# Patient Record
Sex: MALE | Race: WHITE | NOT HISPANIC OR LATINO
[De-identification: names, ages, dates, MRNs, and addresses within clinical notes are randomized per-mention and may not be internally consistent; named-entity substitution may affect disease eponyms.]

---

## 2017-02-14 ENCOUNTER — APPOINTMENT (OUTPATIENT)
Dept: NEUROLOGY | Facility: CLINIC | Age: 67
End: 2017-02-14

## 2017-04-21 ENCOUNTER — APPOINTMENT (OUTPATIENT)
Dept: NEUROLOGY | Facility: CLINIC | Age: 67
End: 2017-04-21

## 2017-04-21 RX ORDER — LINAGLIPTIN AND METFORMIN HYDROCHLORIDE 2.5; 1 MG/1; MG/1
2.5-1 TABLET, FILM COATED ORAL
Qty: 180 | Refills: 0 | Status: ACTIVE | COMMUNITY
Start: 2017-04-18

## 2017-04-28 ENCOUNTER — RX RENEWAL (OUTPATIENT)
Age: 67
End: 2017-04-28

## 2017-04-28 RX ORDER — MAGNESIUM OXIDE 200 MG
10 TABLET,CHEWABLE ORAL
Qty: 30 | Refills: 4 | Status: ACTIVE | COMMUNITY
Start: 2017-04-28 | End: 1900-01-01

## 2017-06-02 ENCOUNTER — MEDICATION RENEWAL (OUTPATIENT)
Age: 67
End: 2017-06-02

## 2017-06-06 ENCOUNTER — MEDICATION RENEWAL (OUTPATIENT)
Age: 67
End: 2017-06-06

## 2017-06-20 ENCOUNTER — APPOINTMENT (OUTPATIENT)
Dept: NEUROLOGY | Facility: CLINIC | Age: 67
End: 2017-06-20

## 2017-06-20 DIAGNOSIS — G47.00 INSOMNIA, UNSPECIFIED: ICD-10-CM

## 2017-10-10 ENCOUNTER — APPOINTMENT (OUTPATIENT)
Dept: NEUROLOGY | Facility: CLINIC | Age: 67
End: 2017-10-10
Payer: MEDICARE

## 2017-10-10 VITALS — HEART RATE: 70 BPM | SYSTOLIC BLOOD PRESSURE: 132 MMHG | DIASTOLIC BLOOD PRESSURE: 90 MMHG

## 2017-10-10 PROCEDURE — 99214 OFFICE O/P EST MOD 30 MIN: CPT

## 2018-03-13 ENCOUNTER — APPOINTMENT (OUTPATIENT)
Dept: NEUROLOGY | Facility: CLINIC | Age: 68
End: 2018-03-13
Payer: MEDICARE

## 2018-03-13 VITALS
WEIGHT: 220 LBS | DIASTOLIC BLOOD PRESSURE: 81 MMHG | HEIGHT: 72 IN | SYSTOLIC BLOOD PRESSURE: 138 MMHG | HEART RATE: 76 BPM | BODY MASS INDEX: 29.8 KG/M2

## 2018-03-13 PROCEDURE — 99214 OFFICE O/P EST MOD 30 MIN: CPT

## 2018-04-15 ENCOUNTER — RX RENEWAL (OUTPATIENT)
Age: 68
End: 2018-04-15

## 2018-05-29 ENCOUNTER — MEDICATION RENEWAL (OUTPATIENT)
Age: 68
End: 2018-05-29

## 2018-08-14 ENCOUNTER — APPOINTMENT (OUTPATIENT)
Dept: NEUROLOGY | Facility: CLINIC | Age: 68
End: 2018-08-14
Payer: MEDICARE

## 2018-08-14 PROCEDURE — 99214 OFFICE O/P EST MOD 30 MIN: CPT

## 2018-08-14 RX ORDER — CLONAZEPAM 0.5 MG/1
0.5 TABLET ORAL
Qty: 60 | Refills: 0 | Status: ACTIVE | COMMUNITY
Start: 2018-08-06

## 2018-08-14 RX ORDER — BETAMETHASONE DIPROPIONATE 0.5 MG/G
0.05 LOTION TOPICAL
Qty: 60 | Refills: 0 | Status: ACTIVE | COMMUNITY
Start: 2018-06-01

## 2018-08-14 RX ORDER — GLIMEPIRIDE 2 MG/1
2 TABLET ORAL
Qty: 90 | Refills: 0 | Status: ACTIVE | COMMUNITY
Start: 2018-07-23

## 2018-08-14 RX ORDER — METHYLPREDNISOLONE 4 MG/1
4 TABLET ORAL
Qty: 21 | Refills: 0 | Status: ACTIVE | COMMUNITY
Start: 2018-06-11

## 2018-08-14 RX ORDER — COLCHICINE 0.6 MG/1
0.6 TABLET, FILM COATED ORAL
Qty: 42 | Refills: 0 | Status: ACTIVE | COMMUNITY
Start: 2018-03-19

## 2018-08-14 RX ORDER — BACLOFEN 10 MG/1
10 TABLET ORAL
Qty: 30 | Refills: 0 | Status: ACTIVE | COMMUNITY
Start: 2018-06-11

## 2018-08-14 RX ORDER — ETODOLAC 400 MG/1
400 TABLET, FILM COATED ORAL
Qty: 60 | Refills: 0 | Status: ACTIVE | COMMUNITY
Start: 2018-07-25

## 2018-08-14 RX ORDER — NYSTATIN 100000 1/G
100000 POWDER TOPICAL
Qty: 60 | Refills: 0 | Status: ACTIVE | COMMUNITY
Start: 2018-05-30

## 2018-08-14 RX ORDER — CLOTRIMAZOLE 10 MG/ML
1 SOLUTION TOPICAL
Qty: 30 | Refills: 0 | Status: ACTIVE | COMMUNITY
Start: 2018-06-01

## 2018-10-16 ENCOUNTER — RX RENEWAL (OUTPATIENT)
Age: 68
End: 2018-10-16

## 2019-01-29 ENCOUNTER — APPOINTMENT (OUTPATIENT)
Dept: NEUROLOGY | Facility: CLINIC | Age: 69
End: 2019-01-29
Payer: MEDICARE

## 2019-01-29 PROCEDURE — 99214 OFFICE O/P EST MOD 30 MIN: CPT

## 2019-01-29 NOTE — HISTORY OF PRESENT ILLNESS
[FreeTextEntry1] : Patient is a 68 year old right handed man who was diagnosed with Parkinson's disease in 2014, when he started with difficulty writing, difficulty with gait, and decreased right arm swing. Patient states he is doing well. \par \par 1. Currently taking Sinemet 25/250 1 tab TID, Azilect, but Neupro stopped. No hallucinations. Had added artane (bid) for tremor, helping with no side effects . The tremors have improved since starting Artane, he also states improvement in walking and more energy. No swallowing difficulties or choking episode. He works in marketing for the hospitality industry. No motor fluctuations, no dyskinesias. No falls.\par 2. Sleeping better with Xanax and he uses it when he needs it. No acting out of his dreams. Mood is ok. \par 3. Changes in memory of general events, forgetful of things he has to do since the last four months, no changes since starting Artane. Did not start B12 supplements yet\par

## 2019-01-29 NOTE — REVIEW OF SYSTEMS
[Sleep Disturbances] : no sleep disturbances [As Noted in HPI] : as noted in HPI [Constipation] : no constipation [Negative] : Heme/Lymph

## 2019-01-29 NOTE — END OF VISIT
[>50% of Time Spent on Counseling and Coordination of Care for  ___] : Greater than 50% of the encounter time was spent on counseling and coordination of care for [unfilled] [Time Spent: ___ minutes] : I have spent [unfilled] minutes of face to face time with the patient [FreeTextEntry1] : Tabitha

## 2019-01-29 NOTE — PHYSICAL EXAM
[General Appearance - Alert] : alert [General Appearance - In No Acute Distress] : in no acute distress [General Appearance - Well Developed] : well developed [Oriented To Time, Place, And Person] : oriented to person, place, and time [Impaired Insight] : insight and judgment were intact [Mood] : the mood was normal [Person] : oriented to person [Place] : oriented to place [Time] : oriented to time [Short Term Intact] : short term memory intact [Registration Intact] : recent registration memory intact [Cranial Nerves Optic (II)] : visual acuity intact bilaterally,  visual fields full to confrontation, pupils equal round and reactive to light [Cranial Nerves Oculomotor (III)] : extraocular motion intact [Cranial Nerves Trigeminal (V)] : facial sensation intact symmetrically [Cranial Nerves Facial (VII)] : face symmetrical [Cranial Nerves Vestibulocochlear (VIII)] : hearing was intact bilaterally [Cranial Nerves Glossopharyngeal (IX)] : tongue and palate midline [Cranial Nerves Accessory (XI - Cranial And Spinal)] : head turning and shoulder shrug symmetric [Cranial Nerves Hypoglossal (XII)] : there was no tongue deviation with protrusion [Motor Handedness Right-Handed] : the patient is right hand dominant [FreeTextEntry1] : Facial expression is decreased as well as blinking rate. Speech is normal. Extraocular movements were intact with no square wave jerks seen. No resting tremor noted today in right side. Rigidity in RUE upon activation, no neck rigidity. Bradykinesia in right side more than left in finger and foot taps. Able to get out of the chair with arms crossed. Gait with no stooped posture, at times slow stride, with decreased right arm swing, no re-emergent tremor in right side today. Pull test was negative.

## 2019-01-29 NOTE — DISCUSSION/SUMMARY
[FreeTextEntry1] : In summary, Mr. Bynum is a 67 year old right handed man with Parkinson's disease who comes for follow up. he states since starting Artane, his tremor has improved as well as his energy and walking difficulties. No recent falls, no constipation and sleeping better. The examination was significant for a  right side resting tremor, and right sided rigidity and slowing. \par \par Overall, he has been doing well with no motor fluctuations for which will try to lower Neupro to 4mg daily to try to discontinue and continue  patient on three PD medications. If while decreasing the Neupro he feels worse, he can increase it again to 8mg. In terms of his memory, no changes since starting Artane. Encouraged to start doing exercises. Follow up in 4 months, sooner if new problems arises.\par \par Recommendations:\par 1. Continue Sinemet 25/250 1 tab TID, Azilect, Artane 2mg BID, no need for patch\par 2. Take B12 supplements (value not low officially, but lower end). \par 3. I encouraged exercise.\par 4. RTC in 4 months\par \par \par

## 2019-07-10 ENCOUNTER — APPOINTMENT (OUTPATIENT)
Dept: NEUROLOGY | Facility: CLINIC | Age: 69
End: 2019-07-10
Payer: MEDICARE

## 2019-07-10 PROCEDURE — 96372 THER/PROPH/DIAG INJ SC/IM: CPT

## 2019-07-10 PROCEDURE — 99214 OFFICE O/P EST MOD 30 MIN: CPT | Mod: 25

## 2019-07-10 RX ORDER — MELOXICAM 15 MG/1
15 TABLET ORAL
Qty: 30 | Refills: 0 | Status: ACTIVE | COMMUNITY
Start: 2019-04-03

## 2019-07-10 RX ORDER — FLUTICASONE PROPIONATE 50 UG/1
50 SPRAY, METERED NASAL
Qty: 16 | Refills: 0 | Status: ACTIVE | COMMUNITY
Start: 2019-03-27

## 2019-07-10 RX ORDER — ROTIGOTINE 4 MG/24H
4 PATCH, EXTENDED RELEASE TRANSDERMAL DAILY
Qty: 30 | Refills: 5 | Status: DISCONTINUED | COMMUNITY
Start: 2017-10-10 | End: 2019-07-10

## 2019-07-10 RX ORDER — AZITHROMYCIN 250 MG/1
250 TABLET, FILM COATED ORAL
Qty: 6 | Refills: 0 | Status: ACTIVE | COMMUNITY
Start: 2019-03-27

## 2019-07-10 NOTE — HISTORY OF PRESENT ILLNESS
[FreeTextEntry1] : Patient is a 68 year old right handed man who was diagnosed with Parkinson's disease in 2014, when he started with difficulty writing, difficulty with gait, and decreased right arm swing. Patient states he continues to have the R hand tremor, but has not been taking Sinemet as prescribed. Instead, he may take it up to two times a day. He does report compliance with Rasagiline and Trihexylphenidyl daily. \par \par 1. Currently taking Sinemet 25/250 1 tab BID, intermittently, Rasagiline 1mg daily, but Neupro stopped. No hallucinations. Had added Artane (bid) for tremor, helping with no side effects, but patient only taking once a day. The tremors have improved since starting Artane. No swallowing difficulties or choking episode. He works in marketing for the hospitality industry. No motor fluctuations, no dyskinesias. No falls. He is unsure how long the Sinemet lasts. Sometimes he has trouble shaving as he is R handed. Tries to exercise once a week. Recently joined the gym, hoping to do more. No speech difficulty.\par 2. Sleeping better with Xanax and he uses it when he needs it, but has not had a prescription in a while. No acting out of his dreams, but does have vivid dreaming. Mood is ok. Does report increased stress from finances and his ill wife.\par 3. Changes in memory of general events, forgetful of things he has to do since over the last year, no changes since starting Artane. Did not start B12 supplements yet.

## 2019-07-10 NOTE — PHYSICAL EXAM
[General Appearance - Alert] : alert [General Appearance - In No Acute Distress] : in no acute distress [General Appearance - Well Developed] : well developed [Oriented To Time, Place, And Person] : oriented to person, place, and time [Impaired Insight] : insight and judgment were intact [Person] : oriented to person [Time] : oriented to time [Place] : oriented to place [Short Term Intact] : short term memory intact [Registration Intact] : recent registration memory intact [Cranial Nerves Optic (II)] : visual acuity intact bilaterally,  visual fields full to confrontation, pupils equal round and reactive to light [Cranial Nerves Facial (VII)] : face symmetrical [Cranial Nerves Trigeminal (V)] : facial sensation intact symmetrically [Cranial Nerves Oculomotor (III)] : extraocular motion intact [Cranial Nerves Vestibulocochlear (VIII)] : hearing was intact bilaterally [Cranial Nerves Accessory (XI - Cranial And Spinal)] : head turning and shoulder shrug symmetric [Cranial Nerves Hypoglossal (XII)] : there was no tongue deviation with protrusion [Cranial Nerves Glossopharyngeal (IX)] : tongue and palate midline [Motor Handedness Right-Handed] : the patient is right hand dominant [FreeTextEntry1] : Last dose of Sinemet was 10AM this morning. Facial expression is decreased as well as blinking rate. Speech is normal. Extraocular movements were intact with no square wave jerks seen, but some impairment of smooth pursuit. Resting tremor noted today in right hand. Rigidity in RUE upon activation, no neck rigidity. Bradykinesia in right side more than left in fingers and foot taps. Able to get out of the chair with arms crossed. Gait with no stooped posture, at times slow stride, with decreased right arm swing, and re-emergent tremor on right side today. Pull test was negative.

## 2019-07-10 NOTE — END OF VISIT
[] : Fellow [>50% of Time Spent on Counseling and Coordination of Care for  ___] : Greater than 50% of the encounter time was spent on counseling and coordination of care for [unfilled] [Time Spent: ___ minutes] : I have spent [unfilled] minutes of face to face time with the patient [FreeTextEntry1] : Tabitha

## 2019-07-10 NOTE — DISCUSSION/SUMMARY
[FreeTextEntry1] : In summary, Mr. Bynum is a 69 year old right handed man with Parkinson's disease diagnosed in 2014, who comes for follow up. He states since starting Artane, his tremor has improved as well as his energy and walking difficulties. However, he is not taking the Sinemet as prescribed, and is only taking it up to two times a day. He reports compliance with daily Rasagiline and Trihexyphenidyl. No recent falls, no constipation. Does have some trouble falling asleep due to increased stress at home. The examination was significant for a right side resting tremor, and right sided rigidity and bradykinesia more than the L, with re-emergent R hand tremor when walking. \par \par Recommendations:\par 1. Continue Sinemet 25/250 1 tab TID, Rasagiline 1mg daily, Trihexyphenidyl 2mg daily, no need for Neupro patch\par 2. Take B12 supplements (value not low officially, but lower end). B12 injection given in clinic today. \par 3. 0.25mg Xanax at night as needed\par 3. Encouraged exercise.\par 4. RTC in 6 months\par \par \par

## 2019-07-10 NOTE — REVIEW OF SYSTEMS
[As Noted in HPI] : as noted in HPI [Negative] : Endocrine [Sleep Disturbances] : no sleep disturbances [Constipation] : no constipation

## 2020-01-29 ENCOUNTER — APPOINTMENT (OUTPATIENT)
Dept: NEUROLOGY | Facility: CLINIC | Age: 70
End: 2020-01-29
Payer: MEDICARE

## 2020-01-29 PROCEDURE — 99214 OFFICE O/P EST MOD 30 MIN: CPT

## 2020-01-29 NOTE — HISTORY OF PRESENT ILLNESS
[FreeTextEntry1] : Patient is a 69 year old right handed man who was diagnosed with Parkinson's disease in 2014, when he started with difficulty writing, difficulty with gait, and decreased right arm swing. \par \par 1. Currently taking Sinemet 25/250 1 tab TID, intermittently, Rasagiline 1mg daily, but Neupro stopped. No hallucinations. Had added Artane (bid) for tremor, helping with no side effects, but patient only taking once a day. The tremors have improved since starting Artane. No swallowing difficulties or choking episode. He works in marketing for the hospitality industry, more part time now. No motor fluctuations, no dyskinesias. No falls. Tries to exercise once a week. Exercises in the house. No speech difficulty.\par 2. Sleeping better with Xanax and he uses it when he needs it, but has not had a prescription in a while. No acting out of his dreams, but does have vivid dreaming. Mood is ok. Does report increased stress from finances and his ill wife.\par 3. Changes in memory of general events, forgetful of things he has to do since over the last year, no changes since starting Artane. Had one B12 injection, and taking supplements now.

## 2020-01-29 NOTE — DISCUSSION/SUMMARY
[FreeTextEntry1] : In summary, Mr. Bynum is a 69 year old right handed man with Parkinson's disease diagnosed in 2014, who comes for follow up. He states since starting Artane, his tremor has improved as well as his energy and walking difficulties. However, he is not taking the Sinemet as prescribed, and is only taking it up to two times a day. He reports compliance with daily Rasagiline and Trihexyphenidyl. No recent falls, no constipation. Does have some trouble falling asleep due to increased stress at home. The examination was significant for a right side resting tremor, and right sided rigidity and bradykinesia more than the L, with re-emergent R hand tremor when walking. \par \par Recommendations:\par 1. Continue Sinemet 25/250 1 tab TID, Rasagiline 1mg daily, Trihexyphenidyl 2mg daily, no need for Neupro patch\par 2. Take B12 supplements (value not low officially, but lower end). Recheck B12, Folate\par 3. 0.25mg Xanax at night as needed\par 3. Encouraged exercise, he wants to do [employer], which I encouraged\par 4. RTC in 6 months\par \par \par

## 2020-01-29 NOTE — PHYSICAL EXAM
[General Appearance - Alert] : alert [General Appearance - In No Acute Distress] : in no acute distress [General Appearance - Well Developed] : well developed [Impaired Insight] : insight and judgment were intact [Place] : oriented to place [Person] : oriented to person [Time] : oriented to time [Cranial Nerves Oculomotor (III)] : extraocular motion intact [Cranial Nerves Optic (II)] : visual acuity intact bilaterally,  visual fields full to confrontation, pupils equal round and reactive to light [Registration Intact] : recent registration memory intact [Cranial Nerves Facial (VII)] : face symmetrical [Cranial Nerves Trigeminal (V)] : facial sensation intact symmetrically [Cranial Nerves Vestibulocochlear (VIII)] : hearing was intact bilaterally [Cranial Nerves Glossopharyngeal (IX)] : tongue and palate midline [Motor Handedness Right-Handed] : the patient is right hand dominant [Cranial Nerves Hypoglossal (XII)] : there was no tongue deviation with protrusion [Cranial Nerves Accessory (XI - Cranial And Spinal)] : head turning and shoulder shrug symmetric [Short Term Intact] : short term memory impaired [Motor Strength] : muscle strength was normal in all four extremities [FreeTextEntry4] : 2/3 delayed recall, 3/3 with hints [FreeTextEntry1] : Facial expression is decreased as well as blinking rate. Speech is normal. Extraocular movements were intact with no square wave jerks seen, but some impairment of smooth pursuit. No resting tremor noted today. Rigidity in RUE upon activation, no neck rigidity. Bradykinesia in right side more than left in fingers and foot taps. Able to get out of the chair with arms crossed. Gait with no stooped posture, at times slow stride, with decreased right arm swing, and re-emergent tremor on right side today. Pull test was negative.

## 2020-01-29 NOTE — REVIEW OF SYSTEMS
[As Noted in HPI] : as noted in HPI [Negative] : Heme/Lymph [Sleep Disturbances] : no sleep disturbances [Constipation] : no constipation

## 2020-06-24 ENCOUNTER — APPOINTMENT (OUTPATIENT)
Dept: NEUROLOGY | Facility: CLINIC | Age: 70
End: 2020-06-24
Payer: MEDICARE

## 2020-06-24 VITALS
WEIGHT: 210 LBS | SYSTOLIC BLOOD PRESSURE: 133 MMHG | DIASTOLIC BLOOD PRESSURE: 79 MMHG | HEART RATE: 66 BPM | BODY MASS INDEX: 28.44 KG/M2 | HEIGHT: 72 IN

## 2020-06-24 PROCEDURE — 99214 OFFICE O/P EST MOD 30 MIN: CPT | Mod: 25

## 2020-06-24 NOTE — END OF VISIT
[Time Spent: ___ minutes] : I have spent [unfilled] minutes of time on the encounter. [>50% of the face to face encounter time was spent on counseling and/or coordination of care for ___] : Greater than 50% of the face to face encounter time was spent on counseling and/or coordination of care for [unfilled] [FreeTextEntry1] : Tabitha

## 2020-06-24 NOTE — HISTORY OF PRESENT ILLNESS
[FreeTextEntry1] : Patient is a 69 year old right handed man who was diagnosed with Parkinson's disease in 2014, when he started with difficulty writing, difficulty with gait, and decreased right arm swing. \par \par 1. Was currently taking Sinemet 25/250 1 tab TID, intermittently, Rasagiline 1mg daily, but Neupro stopped. No hallucinations. Tremor got worse recently, increased to qid (9am, q 4h). This also improved pain. \par Had added Artane (bid) for tremor, helping with no side effects, but patient only taking once a day. The tremors have improved since starting Artane. No swallowing difficulties or choking episode. 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 No motor fluctuations, no dyskinesias. No falls. Tries to exercise once a week. Exercises in the house. No speech difficulty.\par 2. Sleeping better with Xanax and he uses it when he needs it, but has not had a prescription in a while. No acting out of his dreams, but does have vivid dreaming. Mood is ok. Does report increased stress from finances and his ill wife.\par 3. Changes in memory of general events, forgetful of things he has to do since over the last year, no changes since starting Artane. Had one B12 injection, and not taking supplements now. \par

## 2020-06-24 NOTE — DISCUSSION/SUMMARY
[FreeTextEntry1] : In summary, Mr. Bynum is a 69 year old right handed man with Parkinson's disease diagnosed in 2014, who comes for follow up. He states since starting Artane, his tremor has improved as well as his energy and walking difficulties. However, he is not taking the Sinemet as prescribed, and is only taking it up to two times a day. He reports compliance with daily Rasagiline and Trihexyphenidyl. No recent falls, no constipation. Does have some trouble falling asleep due to increased stress at home. The examination was significant for a right side resting tremor, and right sided rigidity and bradykinesia more than the L, with re-emergent R hand tremor when walking. \par \par Recommendations:\par 1. Continue Sinemet 25/250 1 tab QID, Rasagiline 1mg daily, Trihexyphenidyl 2mg daily, no need for Neupro patch\par 2. Take B12 supplements (value not low officially, but lower end)\par 3. 0.25mg Xanax at night as needed\par 4. Encouraged exercise, he wants to do [employer] boxing, which I encouraged\par \par We discussed the above impression, plan and recommendations during the visit. Counseling represented more then 50% of the 30 minute visit time\par \par \par \par

## 2020-06-24 NOTE — PHYSICAL EXAM
[General Appearance - Alert] : alert [Impaired Insight] : insight and judgment were intact [General Appearance - In No Acute Distress] : in no acute distress [General Appearance - Well Developed] : well developed [Person] : oriented to person [Time] : oriented to time [Place] : oriented to place [Cranial Nerves Optic (II)] : visual acuity intact bilaterally,  visual fields full to confrontation, pupils equal round and reactive to light [Registration Intact] : recent registration memory intact [Cranial Nerves Oculomotor (III)] : extraocular motion intact [Cranial Nerves Facial (VII)] : face symmetrical [Cranial Nerves Vestibulocochlear (VIII)] : hearing was intact bilaterally [Cranial Nerves Accessory (XI - Cranial And Spinal)] : head turning and shoulder shrug symmetric [Cranial Nerves Glossopharyngeal (IX)] : tongue and palate midline [Motor Strength] : muscle strength was normal in all four extremities [Cranial Nerves Hypoglossal (XII)] : there was no tongue deviation with protrusion [Motor Handedness Right-Handed] : the patient is right hand dominant [Short Term Intact] : short term memory impaired [FreeTextEntry4] : 2/3 delayed recall, 3/3 with hints [FreeTextEntry1] : Facial expression is decreased as well as blinking rate. Speech is normal. Extraocular movements were intact with no square wave jerks seen, but some impairment of smooth pursuit. No resting tremor noted today. Rigidity in RUE upon activation, no neck rigidity. Bradykinesia in right side more than left in fingers and foot taps. Able to get out of the chair with arms crossed. Gait with no stooped posture, minimally decreased right arm swing, and no re-emergent tremor on right side today. Pull test was negative.

## 2020-07-29 ENCOUNTER — APPOINTMENT (OUTPATIENT)
Dept: NEUROLOGY | Facility: CLINIC | Age: 70
End: 2020-07-29
Payer: MEDICARE

## 2020-07-29 VITALS — TEMPERATURE: 97.3 F

## 2020-07-29 VITALS
SYSTOLIC BLOOD PRESSURE: 115 MMHG | BODY MASS INDEX: 28.71 KG/M2 | HEART RATE: 78 BPM | HEIGHT: 72 IN | WEIGHT: 212 LBS | DIASTOLIC BLOOD PRESSURE: 72 MMHG

## 2020-07-29 PROCEDURE — 99214 OFFICE O/P EST MOD 30 MIN: CPT

## 2020-07-29 NOTE — PHYSICAL EXAM
[General Appearance - Alert] : alert [General Appearance - In No Acute Distress] : in no acute distress [General Appearance - Well Developed] : well developed [Impaired Insight] : insight and judgment were intact [Person] : oriented to person [Time] : oriented to time [Place] : oriented to place [Registration Intact] : recent registration memory intact [Cranial Nerves Optic (II)] : visual acuity intact bilaterally,  visual fields full to confrontation, pupils equal round and reactive to light [Cranial Nerves Facial (VII)] : face symmetrical [Cranial Nerves Oculomotor (III)] : extraocular motion intact [Cranial Nerves Vestibulocochlear (VIII)] : hearing was intact bilaterally [Cranial Nerves Glossopharyngeal (IX)] : tongue and palate midline [Cranial Nerves Hypoglossal (XII)] : there was no tongue deviation with protrusion [Cranial Nerves Accessory (XI - Cranial And Spinal)] : head turning and shoulder shrug symmetric [Motor Strength] : muscle strength was normal in all four extremities [Motor Handedness Right-Handed] : the patient is right hand dominant [Short Term Intact] : short term memory impaired [FreeTextEntry4] : 2/3 delayed recall, 3/3 with hints [FreeTextEntry1] : Facial expression is decreased as well as blinking rate. Speech is normal. Extraocular movements were intact with no square wave jerks seen, but some impairment of smooth pursuit. No resting tremor noted today. \par \par Rigidity in RUE upon activation, no neck rigidity. Bradykinesia in right side more than left in fingers and foot taps. Able to get out of the chair with arms crossed. \par \par Gait with mildly stooped posture, minimally decreased right arm swing, and no re-emergent tremor on right side today. Pull test was negative.

## 2020-07-29 NOTE — DISCUSSION/SUMMARY
[FreeTextEntry1] : In summary, Mr. Bynum is a 69 year old right handed man with Parkinson's disease diagnosed in 2014, who comes for follow up. He states since starting Artane, his tremor has improved as well as his energy and walking difficulties. However, he is not taking the Sinemet as prescribed, and is only taking it up to two times a day. He reports compliance with daily Rasagiline and Trihexyphenidyl. No recent falls, no constipation. Does have some trouble falling asleep due to increased stress at home. The examination was significant for a right side resting tremor, and right sided rigidity and bradykinesia more than the L, with re-emergent R hand tremor when walking. \par \par Recommendations:\par 1. Continue Sinemet 25/250 1 tab QID (he is better with that), Rasagiline 1mg daily, Trihexyphenidyl 2mg daily, no need for Neupro patch\par 2. Take B12 supplements (value not low officially, but lower end), recheck B12\par 3. 0.25mg Xanax at night as needed\par 4. Encouraged exercise, walking\par \par We discussed the above impression, plan and recommendations during the visit. Counseling represented more then 50% of the 30 minute visit time\par \par \par \par

## 2020-07-29 NOTE — HISTORY OF PRESENT ILLNESS
[FreeTextEntry1] : Patient is a 70 year old right handed man who was diagnosed with Parkinson's disease in 2014, when he started with difficulty writing, difficulty with gait, and decreased right arm swing. \par \par 1. Increased taking Sinemet 25/250 to qID (morning, midday, later afternoon, night).Rasagiline 1mg daily, but Neupro stopped. No hallucinations. This improved pain, walking, tremor.  No motor fluctuations, no dyskinesias.\par 2. Had added Artane (bid) for tremor, helping with no side effects, but patient only taking once a day. The tremors have improved since starting Artane. No swallowing difficulties or choking episode. \par 3. Tries to exercise once a week. Exercises in the house. No speech difficulty. 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 Sleeping better with Xanax and he uses it when he needs it, but has not had a prescription in a while. No acting out of his dreams, but does have vivid dreaming. Mood is ok. Does report increased stress from finances and his ill wife.\par 3. Changes in memory of general events, forgetful of things he has to do since over the last year, no changes since starting Artane. Had one B12 injection, and taking supplements now. Mood is anxious. \par

## 2020-12-15 ENCOUNTER — APPOINTMENT (OUTPATIENT)
Dept: NEUROLOGY | Facility: CLINIC | Age: 70
End: 2020-12-15
Payer: MEDICARE

## 2020-12-15 VITALS — TEMPERATURE: 96.3 F

## 2020-12-15 VITALS
HEIGHT: 73 IN | BODY MASS INDEX: 28.23 KG/M2 | SYSTOLIC BLOOD PRESSURE: 115 MMHG | WEIGHT: 213 LBS | DIASTOLIC BLOOD PRESSURE: 71 MMHG | HEART RATE: 68 BPM

## 2020-12-15 PROCEDURE — 99214 OFFICE O/P EST MOD 30 MIN: CPT

## 2020-12-15 RX ORDER — ALPRAZOLAM 0.25 MG/1
0.25 TABLET ORAL
Qty: 30 | Refills: 0 | Status: COMPLETED | COMMUNITY
Start: 2020-12-15 | End: 2020-12-15

## 2020-12-15 RX ORDER — ALPRAZOLAM 0.25 MG/1
0.25 TABLET ORAL
Qty: 30 | Refills: 0 | Status: DISCONTINUED | COMMUNITY
Start: 2017-10-10 | End: 2020-12-15

## 2020-12-15 NOTE — DISCUSSION/SUMMARY
[FreeTextEntry1] : In summary, Mr. Bynum is a 70 year old right handed man with Parkinson's disease diagnosed in 2014, who comes for follow up. He states since starting Artane, his tremor has improved as well as his energy and walking difficulties. However, he is not taking the Sinemet as prescribed, and is only taking it up to two times a day. He reports compliance with daily Rasagiline and Trihexyphenidyl. No recent falls, no constipation. Does have some trouble falling asleep due to increased stress at home.\par \par Recommendations:\par 1. Continue Sinemet 25/250 1 tab QID (he is better with that), Rasagiline 1mg daily, Trihexyphenidyl 2mg daily, no need for Neupro patch\par 2. Taking B12 supplements (value not low officially, but lower end), recheck B12\par 3. 0.25mg Xanax at night as needed\par 4. Encouraged exercise, walking\par \par We discussed the above impression, plan and recommendations during the visit. Counseling represented more then 50% of the 30 minute visit time\par \par \par \par

## 2020-12-15 NOTE — HISTORY OF PRESENT ILLNESS
[FreeTextEntry1] : Patient is a 70 year old right handed man who was diagnosed with Parkinson's disease in 2014, when he started with difficulty writing, difficulty with gait, and decreased right arm swing. \par \par 1. Increased taking Sinemet 25/250 to QID (morning, midday, later afternoon, night). Rasagiline 1mg daily, but Neupro stopped. No hallucinations. This improved pain, walking, tremor.  No motor fluctuations, no dyskinesias.\par 2. Had added Artane (bid) for tremor, helping with no side effects, but patient only taking once a day. The tremors have improved since starting Artane. No swallowing difficulties or choking episode. \par 3. Tries to exercise once a week. Exercises in the house. No speech difficulty. Works from home in [employer] business (what little there is right now)\par 4. Sleeping better with Xanax and he uses it when he needs it, but has not had a prescription in a while. No acting out of his dreams, but does have vivid dreaming. Mood is ok. Does report increased stress from finances and his ill wife.\par 3. Changes in memory of general events, forgetful of things he has to do since over the last year, no changes since starting Artane. Had one B12 injection, and taking supplements now.\par

## 2020-12-15 NOTE — PHYSICAL EXAM
[Person] : oriented to person [Place] : oriented to place [Time] : oriented to time [Registration Intact] : recent registration memory intact [Cranial Nerves Optic (II)] : visual acuity intact bilaterally,  visual fields full to confrontation, pupils equal round and reactive to light [Cranial Nerves Oculomotor (III)] : extraocular motion intact [Cranial Nerves Facial (VII)] : face symmetrical [Cranial Nerves Vestibulocochlear (VIII)] : hearing was intact bilaterally [Cranial Nerves Glossopharyngeal (IX)] : tongue and palate midline [Cranial Nerves Accessory (XI - Cranial And Spinal)] : head turning and shoulder shrug symmetric [Cranial Nerves Hypoglossal (XII)] : there was no tongue deviation with protrusion [Motor Strength] : muscle strength was normal in all four extremities [Motor Handedness Right-Handed] : the patient is right hand dominant [Short Term Intact] : short term memory impaired [FreeTextEntry4] : 2/3 delayed recall, 3/3 with hints [FreeTextEntry1] : Seen 5 hours after dose. Facial expression is decreased as well as blinking rate. Speech is normal. Extraocular movements were intact with no square wave jerks seen, but some impairment of smooth pursuit. No resting tremor noted today. \par \par Rigidity in RUE upon activation, no neck rigidity. Minimal bradykinesia in right side more than left in fingers and foot taps. Able to get out of the chair with arms crossed. \par \par Gait with mildly stooped posture, minimally decreased right arm swing, and no re-emergent tremor on right side today. Good stride. Pull test was negative.

## 2021-01-25 ENCOUNTER — RX RENEWAL (OUTPATIENT)
Age: 71
End: 2021-01-25

## 2021-01-25 RX ORDER — TRIHEXYPHENIDYL HYDROCHLORIDE 2 MG/1
2 TABLET ORAL
Qty: 90 | Refills: 3 | Status: ACTIVE | COMMUNITY
Start: 2017-06-20 | End: 1900-01-01

## 2021-04-16 ENCOUNTER — APPOINTMENT (OUTPATIENT)
Dept: NEUROLOGY | Facility: CLINIC | Age: 71
End: 2021-04-16

## 2021-06-15 ENCOUNTER — APPOINTMENT (OUTPATIENT)
Dept: NEUROLOGY | Facility: CLINIC | Age: 71
End: 2021-06-15
Payer: MEDICARE

## 2021-06-15 PROCEDURE — 99214 OFFICE O/P EST MOD 30 MIN: CPT

## 2021-06-15 NOTE — HISTORY OF PRESENT ILLNESS
[FreeTextEntry1] : Patient is a 70 year old right handed man who was diagnosed with Parkinson's disease in 2014, when he started with difficulty writing, difficulty with gait, and decreased right arm swing. \par \par 1. Worse over past month. Did not increase taking Sinemet 25/250 tid (not qid as thought) (morning, midday, later afternoon, night). Rasagiline 1mg daily (but ran out), but Neupro stopped. No hallucinations. This improved pain, walking, tremor.  No motor fluctuations, no dyskinesias.\par 2. Had added Artane (bid) for tremor, helping with no side effects, but patient only taking once a day. The tremors have improved since starting Artane. No swallowing difficulties or choking episode. \par 3. Tries to exercise once a week. Exercises in the house. No speech difficulty. Works from home in [employer] business (what little there is right now)\par 4. Sleeping better with Xanax and he uses it when he needs it, but has not had a prescription in a while. No acting out of his dreams, but does have vivid dreaming. Mood is ok. Does report increased stress from finances and his ill wife.\par 5. Changes in memory of general events, forgetful of things he has to do since over the last year, no changes since starting Artane. Had one B12 injection, and taking supplements now (though it seems to be vitamin D)\par

## 2021-06-15 NOTE — DISCUSSION/SUMMARY
[FreeTextEntry1] : In summary, Mr. Bynum is a 70 year old right handed man with Parkinson's disease diagnosed in 2014, who comes for follow up. He states since starting Artane, his tremor has improved as well as his energy and walking difficulties. However, he is not taking the Sinemet as prescribed, and is only taking it up to two times a day. He reports compliance with daily Rasagiline and Trihexyphenidyl. No recent falls, no constipation. Does have some trouble falling asleep due to increased stress at home.\par \par Recommendations:\par 1. I wonder if he is worse because he ran out of rasagiline. restart. Continue Sinemet 25/250 1 tab TID, then go to qid again, Trihexyphenidyl 2mg daily, no need for Neupro patch\par 2. Taking B12 supplements (value not low officially, but lower end), recheck B12\par 3. 0.25mg Xanax at night as needed\par 4. Encouraged exercise, walking\par 5. Monitor memory\par \par We discussed the above impression, plan and recommendations during the visit. Counseling represented more then 50% of the 30 minute visit time\par \par \par \par

## 2021-06-15 NOTE — PHYSICAL EXAM
[Person] : oriented to person [Place] : oriented to place [Time] : oriented to time [Registration Intact] : recent registration memory intact [Cranial Nerves Optic (II)] : visual acuity intact bilaterally,  visual fields full to confrontation, pupils equal round and reactive to light [Cranial Nerves Oculomotor (III)] : extraocular motion intact [Cranial Nerves Facial (VII)] : face symmetrical [Cranial Nerves Vestibulocochlear (VIII)] : hearing was intact bilaterally [Cranial Nerves Glossopharyngeal (IX)] : tongue and palate midline [Cranial Nerves Accessory (XI - Cranial And Spinal)] : head turning and shoulder shrug symmetric [Cranial Nerves Hypoglossal (XII)] : there was no tongue deviation with protrusion [Motor Strength] : muscle strength was normal in all four extremities [Motor Handedness Right-Handed] : the patient is right hand dominant [Short Term Intact] : short term memory impaired [FreeTextEntry4] : 2/3 delayed recall, 3/3 with hints [FreeTextEntry1] : Seen 90 minutes after dose. Facial expression is decreased as well as blinking rate. Speech is normal. Extraocular movements were intact with no square wave jerks seen, but some impairment of smooth pursuit. No resting tremor noted today. \par \par Rigidity in RUE upon activation, no neck rigidity. Minimal bradykinesia in right side more than left in fingers and foot taps. Able to get out of the chair with arms crossed. \par \par Gait with mildly stooped posture, minimally decreased right arm swing, and no re-emergent tremor on right side today. Good stride. Pull test was negative.

## 2021-08-03 ENCOUNTER — RX RENEWAL (OUTPATIENT)
Age: 71
End: 2021-08-03

## 2021-08-06 ENCOUNTER — NON-APPOINTMENT (OUTPATIENT)
Age: 71
End: 2021-08-06

## 2021-10-20 ENCOUNTER — APPOINTMENT (OUTPATIENT)
Dept: NEUROLOGY | Facility: CLINIC | Age: 71
End: 2021-10-20

## 2022-01-21 ENCOUNTER — RX RENEWAL (OUTPATIENT)
Age: 72
End: 2022-01-21

## 2022-01-21 RX ORDER — SELEGILINE HYDROCHLORIDE 5 MG/1
5 TABLET ORAL
Qty: 60 | Refills: 5 | Status: ACTIVE | COMMUNITY
Start: 2021-06-21 | End: 1900-01-01

## 2022-04-05 ENCOUNTER — APPOINTMENT (OUTPATIENT)
Dept: NEUROLOGY | Facility: CLINIC | Age: 72
End: 2022-04-05
Payer: MEDICARE

## 2022-04-05 VITALS
BODY MASS INDEX: 27.3 KG/M2 | WEIGHT: 206 LBS | HEIGHT: 73 IN | SYSTOLIC BLOOD PRESSURE: 144 MMHG | HEART RATE: 87 BPM | DIASTOLIC BLOOD PRESSURE: 81 MMHG

## 2022-04-05 PROCEDURE — 99214 OFFICE O/P EST MOD 30 MIN: CPT

## 2022-04-05 RX ORDER — MORPHINE SULFATE 30 MG/1
30 TABLET, FILM COATED, EXTENDED RELEASE ORAL
Refills: 0 | Status: ACTIVE | COMMUNITY
Start: 2022-04-05

## 2022-04-05 NOTE — HISTORY OF PRESENT ILLNESS
[FreeTextEntry1] : Patient is a 71 year old right handed man who was diagnosed with Parkinson's disease in 2014, when he started with difficulty writing, difficulty with gait, and decreased right arm swing. \par \par 1. Taking Sinemet 25/250 qid now. Also on selegiline 5 mg bid. This improved pain, walking, tremor.  No motor fluctuations, no dyskinesias.\par 2. Had added Artane (bid) for tremor, helping with no side effects, but patient only taking once a day. The tremors have improved since starting Artane. No swallowing difficulties or choking episode. No clear hallucinations, but sometimes has sense "there is someone there" \par 3. Tries to exercise once a week. Exercises in the house. No speech difficulty. Works from home in [employer] business (what little there is right now)\par 4. Sleeping better with Xanax and he uses it when he needs it, but has not had a prescription in a while. No acting out of his dreams, but does have vivid dreaming. Mood is ok. Does report increased stress from finances and his ill wife.\par 5. Changes in memory of general events, forgetful of things he has to do since over the last year, no changes since starting Artane. Had one B12 injection, and taking supplements now (though it seems to be vitamin D)\par 6. Increase lower back pain, now on morphine. Has a spinal cord stimulator

## 2022-04-05 NOTE — PHYSICAL EXAM
[Person] : oriented to person [Place] : oriented to place [Time] : oriented to time [Registration Intact] : recent registration memory intact [Cranial Nerves Optic (II)] : visual acuity intact bilaterally,  visual fields full to confrontation, pupils equal round and reactive to light [Cranial Nerves Oculomotor (III)] : extraocular motion intact [Cranial Nerves Facial (VII)] : face symmetrical [Cranial Nerves Vestibulocochlear (VIII)] : hearing was intact bilaterally [Cranial Nerves Glossopharyngeal (IX)] : tongue and palate midline [Cranial Nerves Accessory (XI - Cranial And Spinal)] : head turning and shoulder shrug symmetric [Cranial Nerves Hypoglossal (XII)] : there was no tongue deviation with protrusion [Motor Strength] : muscle strength was normal in all four extremities [Motor Handedness Right-Handed] : the patient is right hand dominant [Short Term Intact] : short term memory impaired [FreeTextEntry4] : 2/3 delayed recall, 3/3 with hints [FreeTextEntry1] : Last sinemet 3.5 hours ago. Facial expression is decreased as well as blinking rate. Speech is normal. Extraocular movements were intact with no square wave jerks seen, but some impairment of smooth pursuit. No resting tremor noted today. \par \par Rigidity in RUE upon activation, no neck rigidity. Minimal bradykinesia in left side more than right in fingers and foot taps. Able to get out of the chair with arms crossed. \par \par Gait with mildly stooped posture, minimally decreased right arm swing, and no re-emergent tremor on right side today. Good stride. Pull test was negative, took a few steps the first time.\par \par Left leg tremor.

## 2022-04-05 NOTE — DISCUSSION/SUMMARY
[FreeTextEntry1] : In summary, Mr. Bynum is a 70 year old right handed man with Parkinson's disease diagnosed in 2014, who comes for follow up. He states since starting Artane, his tremor has improved as well as his energy and walking difficulties. However, he is not taking the Sinemet as prescribed, and is only taking it up to two times a day. He reports compliance with daily Rasagiline and Trihexyphenidyl. No recent falls, no constipation. Does have some trouble falling asleep due to increased stress at home.\par \par Recommendations:\par 1. Continue Sinemet 25/250 1 tab qid and selegiline bid, lower Trihexyphenidyl back to 2mg daily (to see if hallucinations get better)\par 2. Monitor memory. Taking B12 supplements (value not low officially, but lower end), recheck\par 3. 0.25mg Xanax at night as needed\par 4. Encouraged exercise, walking. PT for back pain\par \par We discussed the above impression, plan and recommendations during the visit. Counseling represented more then 50% of the 30 minute visit time

## 2022-05-10 ENCOUNTER — NON-APPOINTMENT (OUTPATIENT)
Age: 72
End: 2022-05-10

## 2022-06-03 ENCOUNTER — NON-APPOINTMENT (OUTPATIENT)
Age: 72
End: 2022-06-03

## 2022-06-22 ENCOUNTER — RX RENEWAL (OUTPATIENT)
Age: 72
End: 2022-06-22

## 2022-06-22 RX ORDER — CARBIDOPA AND LEVODOPA 25; 250 MG/1; MG/1
25-250 TABLET ORAL 4 TIMES DAILY
Qty: 360 | Refills: 3 | Status: ACTIVE | COMMUNITY
Start: 2017-04-21 | End: 1900-01-01

## 2022-06-23 ENCOUNTER — NON-APPOINTMENT (OUTPATIENT)
Age: 72
End: 2022-06-23

## 2022-07-01 ENCOUNTER — APPOINTMENT (OUTPATIENT)
Dept: NEUROLOGY | Facility: CLINIC | Age: 72
End: 2022-07-01

## 2022-07-01 VITALS
HEART RATE: 76 BPM | DIASTOLIC BLOOD PRESSURE: 80 MMHG | BODY MASS INDEX: 26.51 KG/M2 | SYSTOLIC BLOOD PRESSURE: 140 MMHG | HEIGHT: 73 IN | WEIGHT: 200 LBS

## 2022-07-01 PROCEDURE — 99214 OFFICE O/P EST MOD 30 MIN: CPT

## 2022-07-01 RX ORDER — OXYCODONE 18 MG/1
18 CAPSULE, EXTENDED RELEASE ORAL
Qty: 28 | Refills: 0 | Status: ACTIVE | COMMUNITY
Start: 2022-05-04

## 2022-07-01 RX ORDER — CYCLOBENZAPRINE HYDROCHLORIDE 10 MG/1
10 TABLET, FILM COATED ORAL
Qty: 90 | Refills: 0 | Status: ACTIVE | COMMUNITY
Start: 2022-06-16

## 2022-07-01 RX ORDER — GABAPENTIN 300 MG/1
300 CAPSULE ORAL
Qty: 180 | Refills: 0 | Status: ACTIVE | COMMUNITY
Start: 2022-03-03

## 2022-07-01 RX ORDER — INSULIN DEGLUDEC INJECTION 100 U/ML
100 INJECTION, SOLUTION SUBCUTANEOUS
Qty: 30 | Refills: 0 | Status: ACTIVE | COMMUNITY
Start: 2021-12-10

## 2022-07-01 RX ORDER — HYDROCODONE BITARTRATE AND ACETAMINOPHEN 10; 325 MG/1; MG/1
10-325 TABLET ORAL
Qty: 90 | Refills: 0 | Status: ACTIVE | COMMUNITY
Start: 2022-06-05

## 2022-07-01 RX ORDER — OXYCODONE 27 MG/1
27 CAPSULE, EXTENDED RELEASE ORAL
Qty: 60 | Refills: 0 | Status: ACTIVE | COMMUNITY
Start: 2022-05-18

## 2022-07-01 RX ORDER — FUROSEMIDE 20 MG/1
20 TABLET ORAL
Qty: 30 | Refills: 0 | Status: ACTIVE | COMMUNITY
Start: 2022-05-03

## 2022-07-01 RX ORDER — HYDROCODONE BITARTRATE AND ACETAMINOPHEN 5; 325 MG/1; MG/1
5-325 TABLET ORAL
Qty: 20 | Refills: 0 | Status: ACTIVE | COMMUNITY
Start: 2022-05-04

## 2022-07-01 NOTE — DISCUSSION/SUMMARY
[FreeTextEntry1] : In summary, Mr. Bynum is a 70 year old right handed man with Parkinson's disease diagnosed in 2014, who comes for follow up. He states since starting Artane, his tremor has improved as well as his energy and walking difficulties. However, he is not taking the Sinemet as prescribed, and is only taking it up to two times a day. He reports compliance with daily Rasagiline and Trihexyphenidyl. No recent falls, no constipation. Does have some trouble falling asleep due to increased stress at home.\par \par Recommendations:\par 1. Continue Sinemet 25/250 1 tab 5x/day and selegiline bid, lower Trihexyphenidyl back to 2mg daily (to see if hallucinations get better). Does have some OFF pain, could consider DBS\par 2. Monitor memory. Taking B12 supplements (value not low officially, but lower end), recheck\par 3. 0.25mg Xanax at night as needed\par 4. Encouraged exercise, walking. PT for back pain. \par \par We discussed the above impression, plan and recommendations during the visit. Counseling represented more then 50% of the 30 minute visit time

## 2022-07-01 NOTE — PHYSICAL EXAM
[Person] : oriented to person [Place] : oriented to place [Time] : oriented to time [Short Term Intact] : short term memory impaired [Registration Intact] : recent registration memory intact [Cranial Nerves Optic (II)] : visual acuity intact bilaterally,  visual fields full to confrontation, pupils equal round and reactive to light [Cranial Nerves Oculomotor (III)] : extraocular motion intact [Cranial Nerves Facial (VII)] : face symmetrical [Cranial Nerves Vestibulocochlear (VIII)] : hearing was intact bilaterally [Motor Strength] : muscle strength was normal in all four extremities [Motor Handedness Right-Handed] : the patient is right hand dominant [FreeTextEntry4] : 2/3 delayed recall, 3/3 with hints [FreeTextEntry1] : Last sinemet 3.5 hours ago. Facial expression is decreased as well as blinking rate. Speech is normal. Extraocular movements were intact with no square wave jerks seen, but some impairment of smooth pursuit. No resting tremor noted today. \par \par Rigidity in RUE upon activation, no neck rigidity. Minimal bradykinesia in left side more than right in fingers and foot taps. Able to get out of the chair with arms crossed. \par \par Gait with mildly stooped posture, minimally decreased right arm swing, and no re-emergent tremor on right side today. Good stride. Pull test was negative, took a few steps the first time.\par \par Left leg tremor.

## 2022-07-01 NOTE — HISTORY OF PRESENT ILLNESS
[FreeTextEntry1] : Patient is a 71 year old right handed man who was diagnosed with Parkinson's disease in 2014, when he started with difficulty writing, difficulty with gait, and decreased right arm swing. \par \par 1. Taking Sinemet 25/250 5x/day now (spread evenly between getting up and bedtime). Also on selegiline 5 mg bid. This improved pain, walking, tremor.  No motor fluctuations, no dyskinesias. ADLs independent. \par 2. Had added Artane (bid) for tremor, helping with no side effects. The tremors have improved since starting Artane. No swallowing difficulties or choking episode. No hallucinations.\par 3. Tries to exercise once a week. Exercises in the house. No speech difficulty. Stopped working. PT/OT come to house. \par 4. Sleeping better with Xanax and he uses it when he needs it, but has not had a prescription in a while. No acting out of his dreams, but does have vivid dreaming. Mood is ok. Does report increased stress from finances and his ill wife (has been in rehab for 5 months, now needs knee surgery)\par 5. Changes in memory of general events, forgetful of things he has to do since over the last year, no changes since starting Artane. Had one B12 injection, and taking supplements now (though it seems to be vitamin D)\par 6. Increase lower back pain, now on morphine. Has a spinal cord stimulator, and taking xtampza and duloxetine(from pain mgmt).

## 2022-08-08 ENCOUNTER — NON-APPOINTMENT (OUTPATIENT)
Age: 72
End: 2022-08-08

## 2022-08-09 ENCOUNTER — APPOINTMENT (OUTPATIENT)
Dept: NEUROLOGY | Facility: CLINIC | Age: 72
End: 2022-08-09

## 2022-08-09 DIAGNOSIS — F32.A DEPRESSION, UNSPECIFIED: ICD-10-CM

## 2022-08-09 DIAGNOSIS — R41.89 OTHER SYMPTOMS AND SIGNS INVOLVING COGNITIVE FUNCTIONS AND AWARENESS: ICD-10-CM

## 2022-08-09 PROCEDURE — 99214 OFFICE O/P EST MOD 30 MIN: CPT

## 2022-08-09 RX ORDER — ALPRAZOLAM 0.25 MG/1
0.25 TABLET ORAL
Qty: 60 | Refills: 0 | Status: ACTIVE | COMMUNITY
Start: 2020-12-15 | End: 1900-01-01

## 2022-08-09 NOTE — HISTORY OF PRESENT ILLNESS
[FreeTextEntry1] : Patient is a 72 year old right handed man who was diagnosed with Parkinson's disease in 2014, when he started with difficulty writing, difficulty with gait, and decreased right arm swing. \par \par 1. For the past 2 weeks, the PD symptoms have gotten a lot worse in the context of severe back pain and foot pain.  Went to hospital, for pain and oversedation. Gabapentin was reduced then increased again when pain worsened. \par 2. Taking Sinemet 25/250 5x/day now (spread evenly between getting up and bedtime).  This improved pain, walking, tremor.  No motor fluctuations, no dyskinesias. ADLs independent. \par 3. Had added Artane (bid) for tremor, helping with no side effects. The tremors have improved since starting Artane. No swallowing difficulties or choking episode. No hallucinations. Artane and selegiline held by son for past days. \par 4. Tries to exercise once a week. Exercises in the house. No speech difficulty. Stopped working. PT/OT come to house. Sleeping better with Xanax and he uses it when he needs it, but has not had a prescription in a while. No acting out of his dreams, but does have vivid dreaming. Mood is ok. Does report increased stress from finances and his ill wife (has been in rehab for 5 months, now needs knee surgery)\par 5. Changes in memory of general events, forgetful of things he has to do since over the last year, no changes since starting Artane. Had one B12 injection, and taking supplements now (though it seems to be vitamin D)\par 6. Increase lower back pain, now on morphine. Has a spinal cord stimulator. Xtampa was stopped a month ago, and duloxetine(from pain mgmt). Today, son gave him hydrocodone.

## 2022-08-09 NOTE — DISCUSSION/SUMMARY
[FreeTextEntry1] : In summary, Mr. Bynum is a 72 year old right handed man with Parkinson's disease diagnosed in 2014, who comes for follow up. He states since starting Artane, his tremor has improved as well as his energy and walking difficulties. However, he is not taking the Sinemet as prescribed, and is only taking it up to two times a day. He reports compliance with daily Rasagiline and Trihexyphenidyl. No recent falls, no constipation. Does have some trouble falling asleep due to increased stress at home.\par \par Recommendations:\par 1. Continue Sinemet 25/250 1 tab 5x/day. Does have some OFF pain, could consider DBS. Can gold off on selegiline and artane for now. \par 2. Monitor memory. Taking B12 supplements (value not low officially, but lower end), recheck\par 3. 0.25mg Xanax at night as needed\par 4. I suspect that the acute worsening relates to pain more than PD, given the exam today. I advised to have spinal cord stimulator checked and can also inquire about MM. \par \par We discussed the above impression, plan and recommendations during the visit. Counseling represented more then 50% of the 30 minute visit time

## 2022-08-09 NOTE — PHYSICAL EXAM
[Person] : oriented to person [Place] : oriented to place [Time] : oriented to time [Cranial Nerves Optic (II)] : visual acuity intact bilaterally,  visual fields full to confrontation, pupils equal round and reactive to light [Cranial Nerves Oculomotor (III)] : extraocular motion intact [Cranial Nerves Facial (VII)] : face symmetrical [Cranial Nerves Vestibulocochlear (VIII)] : hearing was intact bilaterally [Motor Strength] : muscle strength was normal in all four extremities [Motor Handedness Right-Handed] : the patient is right hand dominant [Short Term Intact] : short term memory impaired [FreeTextEntry4] : 2/3 delayed recall, 3/3 with hints [FreeTextEntry1] : Last sinemet and gabapentin 2 hours ago, hydrocodone 1 hour ago.  \par \par Facial expression is decreased as well as blinking rate. Speech is normal. Extraocular movements were intact with no square wave jerks seen, but some impairment of smooth pursuit. \par \par No resting tremor noted today. \par \par Rigidity in RUE upon activation, no neck rigidity. Minimal bradykinesia in left side more than right in fingers and foot taps. Able to get out of the chair with arms crossed. \par \par Gait with mildly stooped posture, minimally decreased right arm swing today. Good stride. Pull test was negative, took a few steps the first time. Intermittent tremor of right hand. \par \par No LID (one of the videos they showed me looked like head LID but he was in pain)

## 2022-08-23 ENCOUNTER — APPOINTMENT (OUTPATIENT)
Dept: NEUROLOGY | Facility: CLINIC | Age: 72
End: 2022-08-23

## 2023-01-04 ENCOUNTER — APPOINTMENT (OUTPATIENT)
Dept: NEUROLOGY | Facility: CLINIC | Age: 73
End: 2023-01-04

## 2023-02-02 ENCOUNTER — RX RENEWAL (OUTPATIENT)
Age: 73
End: 2023-02-02

## 2023-03-21 ENCOUNTER — NON-APPOINTMENT (OUTPATIENT)
Age: 73
End: 2023-03-21

## 2023-04-21 ENCOUNTER — APPOINTMENT (OUTPATIENT)
Dept: NEUROLOGY | Facility: CLINIC | Age: 73
End: 2023-04-21
Payer: MEDICARE

## 2023-04-21 PROCEDURE — 99214 OFFICE O/P EST MOD 30 MIN: CPT

## 2023-04-21 NOTE — PHYSICAL EXAM
[Person] : oriented to person [Place] : oriented to place [Time] : oriented to time [Cranial Nerves Optic (II)] : visual acuity intact bilaterally,  visual fields full to confrontation, pupils equal round and reactive to light [Cranial Nerves Oculomotor (III)] : extraocular motion intact [Cranial Nerves Facial (VII)] : face symmetrical [Cranial Nerves Vestibulocochlear (VIII)] : hearing was intact bilaterally [Motor Strength] : muscle strength was normal in all four extremities [Motor Handedness Right-Handed] : the patient is right hand dominant [FreeTextEntry4] : 2/3 delayed recall, 3/3 with hints [FreeTextEntry1] : Last sinemet and gabapentin 2 hours ago\par \par Facial expression is decreased as well as blinking rate. Speech is normal. Extraocular movements were intact with no square wave jerks seen, but some impairment of smooth pursuit. \par \par No resting tremor noted today. \par \par Rigidity in RUE upon activation, no neck rigidity. Minimal bradykinesia in left side more than right in fingers and foot taps. Did not try to get up without arms (back pain). \par \par Gait with mildly stooped posture, minimally decreased right arm swing today. Good stride. Pull test was negative, took a few steps the first time. Intermittent tremor of right hand. \par \par No LID

## 2023-04-21 NOTE — DISCUSSION/SUMMARY
[FreeTextEntry1] : In summary, Mr. Bynum is a 72 year old right handed man with Parkinson's disease diagnosed in 2014, who comes for follow up. He states since starting Artane, his tremor has improved as well as his energy and walking difficulties. However, he is not taking the Sinemet as prescribed, and is only taking it up to two times a day. He reports compliance with daily Rasagiline and Trihexyphenidyl. No recent falls, no constipation. Does have some trouble falling asleep due to increased stress at home.\par \par Recommendations:\par 1. Continue Sinemet 25/250 1 tab 5x/day. Does have some OFF pain, could consider DBS. Can hold off on selegiline and artane for now. \par 2. Monitor memory. Taking B12 supplements (value not low officially, but lower end), recheck\par 3. 0.25mg Xanax at night as needed\par 4. Will get MRI of L-spine to see if he might need to see another spine or pain mgmt.\par \par We discussed the above impression, plan and recommendations during the visit. Counseling represented more then 50% of the 30 minute visit time

## 2023-04-21 NOTE — HISTORY OF PRESENT ILLNESS
[FreeTextEntry1] : Patient is a 72 year old right handed man who was diagnosed with Parkinson's disease in 2014, when he started with difficulty writing, difficulty with gait, and decreased right arm swing. \par \par Since last visit. \par \par Hospitalized last September for kidney failure, on dialysis for a month, then rehab, now home\par \par 1.  Back pain continues to be the big issues, worse when standing. Has a spinal cord stimulator, due to be removed. Used to see pain management, now on duloxetine 60 mg daily, and gabapentin 300 mg tid. \par 2. Taking Sinemet 25/250 5x/day now (spread evenly between getting up and bedtime).  This improved pain, walking, tremor.  No motor fluctuations, no dyskinesias. ADLs independent. \par 3. Had added Artane (bid) for tremor, helping with no side effects. The tremors have improved since starting Artane. No swallowing difficulties or choking episode. No hallucinations. Artane and selegiline held by son for past days. \par 4. Tries to exercise once a week. Exercises in the house. No speech difficulty. Stopped working. PT/OT come to house. Sleeping better with Xanax and he uses it when he needs it, but has not had a prescription in a while. No acting out of his dreams, but does have vivid dreaming. Mood is ok. Does report increased stress from finances and his ill wife (has been in rehab for 5 months, now needs knee surgery)\par 5. Changes in memory of general events, forgetful of things he has to do since over the last year, no changes since starting Artane. Had one B12 injection, and taking supplements now (though it seems to be vitamin D)\par

## 2023-05-08 ENCOUNTER — APPOINTMENT (OUTPATIENT)
Dept: MRI IMAGING | Facility: CLINIC | Age: 73
End: 2023-05-08
Payer: MEDICARE

## 2023-05-08 ENCOUNTER — OUTPATIENT (OUTPATIENT)
Dept: OUTPATIENT SERVICES | Facility: HOSPITAL | Age: 73
LOS: 1 days | End: 2023-05-08

## 2023-05-08 DIAGNOSIS — M54.50 LOW BACK PAIN, UNSPECIFIED: ICD-10-CM

## 2023-05-08 PROCEDURE — 72148 MRI LUMBAR SPINE W/O DYE: CPT | Mod: 26

## 2023-05-15 ENCOUNTER — NON-APPOINTMENT (OUTPATIENT)
Age: 73
End: 2023-05-15

## 2023-05-16 ENCOUNTER — APPOINTMENT (OUTPATIENT)
Dept: MRI IMAGING | Facility: CLINIC | Age: 73
End: 2023-05-16

## 2023-06-12 ENCOUNTER — APPOINTMENT (OUTPATIENT)
Dept: ULTRASOUND IMAGING | Facility: CLINIC | Age: 73
End: 2023-06-12
Payer: MEDICARE

## 2023-06-12 ENCOUNTER — OUTPATIENT (OUTPATIENT)
Dept: OUTPATIENT SERVICES | Facility: HOSPITAL | Age: 73
LOS: 1 days | End: 2023-06-12

## 2023-06-12 DIAGNOSIS — Z00.00 ENCOUNTER FOR GENERAL ADULT MEDICAL EXAMINATION WITHOUT ABNORMAL FINDINGS: ICD-10-CM

## 2023-06-12 PROCEDURE — 76775 US EXAM ABDO BACK WALL LIM: CPT | Mod: 26

## 2023-08-30 ENCOUNTER — APPOINTMENT (OUTPATIENT)
Dept: PAIN MANAGEMENT | Facility: CLINIC | Age: 73
End: 2023-08-30
Payer: MEDICARE

## 2023-08-30 VITALS
BODY MASS INDEX: 27.09 KG/M2 | HEART RATE: 82 BPM | DIASTOLIC BLOOD PRESSURE: 78 MMHG | SYSTOLIC BLOOD PRESSURE: 165 MMHG | HEIGHT: 72 IN | WEIGHT: 200 LBS

## 2023-08-30 PROCEDURE — 99215 OFFICE O/P EST HI 40 MIN: CPT

## 2023-08-30 NOTE — ASSESSMENT
[FreeTextEntry1] : check gabapentin 600 mgs tid pending [id number] from PCP in view of prior ho kidney failure.   Prior pain docs recoords Consider TPI  More aggressive PT

## 2023-08-30 NOTE — HISTORY OF PRESENT ILLNESS
[FreeTextEntry1] : This is a case of a 73   -year-old right-handed male with a past medical history of PD, NIDDM, who presents with a chief complaint of BACK PAIN  Patient reports having back pain that is non radicular in nature received chiropractic treatments for lumbar spinal stenosis. These treatments provided transient relief. Has PT at home 2 times per week also providing transient relief. Denies bowel, bladder dysfunction. Denies focal weakness or numbness.  Retired from working in [employer] business - one year ago. Now watches TV or plays on computer. Back pain is not present upon sitting. upon standing and walking. Shopping carts help alleviate the pain. He notices bending over to get greater comfort.  Takes trazodone for sleep.  Tried ESIs x 6-8 wo relief. RF ineffective. Neurostim ineffective. Went to spine surgeon who felt he was not a surgical candidate.  5 months ago patient was in a coma for 1-2 weeks followed by renal failure requiring dialysis. Patient thinks it was due to painkillers, ie hydrocodone3-4 per day. Gabapentin for neuropathy 1200 MGS QD.Meloxican decided not to try.  Meloxicam helped "a little". Triggers: None Comorbidities: None   Other pain conditions: None Imaging: None Medications: None (Non pain) (Pain)  Interventions: None CAM therapies: None SHX: No tobacco ppd; Cannabis  Family history: Noncontributory Allergies: NKA

## 2023-10-09 ENCOUNTER — APPOINTMENT (OUTPATIENT)
Dept: PAIN MANAGEMENT | Facility: CLINIC | Age: 73
End: 2023-10-09
Payer: MEDICARE

## 2023-10-09 VITALS
BODY MASS INDEX: 25.45 KG/M2 | SYSTOLIC BLOOD PRESSURE: 122 MMHG | DIASTOLIC BLOOD PRESSURE: 77 MMHG | HEIGHT: 73 IN | HEART RATE: 73 BPM | WEIGHT: 192 LBS

## 2023-10-09 PROCEDURE — 99214 OFFICE O/P EST MOD 30 MIN: CPT

## 2023-10-09 RX ORDER — ROSUVASTATIN CALCIUM 20 MG/1
20 TABLET, FILM COATED ORAL
Refills: 0 | Status: ACTIVE | COMMUNITY

## 2023-10-09 RX ORDER — DAPAGLIFLOZIN AND METFORMIN HYDROCHLORIDE 10; 1000 MG/1; MG/1
10-1000 TABLET, FILM COATED, EXTENDED RELEASE ORAL
Refills: 0 | Status: ACTIVE | COMMUNITY

## 2023-10-09 RX ORDER — TRAMADOL HYDROCHLORIDE 50 MG/1
50 TABLET, COATED ORAL
Refills: 0 | Status: ACTIVE | COMMUNITY

## 2023-10-10 ENCOUNTER — APPOINTMENT (OUTPATIENT)
Dept: NEUROLOGY | Facility: CLINIC | Age: 73
End: 2023-10-10
Payer: MEDICARE

## 2023-10-10 VITALS
DIASTOLIC BLOOD PRESSURE: 82 MMHG | BODY MASS INDEX: 25.45 KG/M2 | HEART RATE: 69 BPM | WEIGHT: 192 LBS | HEIGHT: 73 IN | SYSTOLIC BLOOD PRESSURE: 147 MMHG

## 2023-10-10 PROCEDURE — 99214 OFFICE O/P EST MOD 30 MIN: CPT

## 2023-10-10 RX ORDER — OPICAPONE 50 MG/1
50 CAPSULE ORAL
Qty: 30 | Refills: 5 | Status: ACTIVE | COMMUNITY
Start: 2023-10-10 | End: 1900-01-01

## 2023-10-11 ENCOUNTER — APPOINTMENT (OUTPATIENT)
Dept: ORTHOPEDIC SURGERY | Facility: CLINIC | Age: 73
End: 2023-10-11
Payer: MEDICARE

## 2023-11-08 ENCOUNTER — APPOINTMENT (OUTPATIENT)
Dept: ORTHOPEDIC SURGERY | Facility: CLINIC | Age: 73
End: 2023-11-08
Payer: MEDICARE

## 2023-11-08 VITALS — BODY MASS INDEX: 25.45 KG/M2 | WEIGHT: 192 LBS | HEIGHT: 73 IN

## 2023-11-08 DIAGNOSIS — M47.816 SPONDYLOSIS W/OUT MYELOPATHY OR RADICULOPATHY, LUMBAR REGION: ICD-10-CM

## 2023-11-08 DIAGNOSIS — F02.80 PARKINSON'S DISEASE: ICD-10-CM

## 2023-11-08 DIAGNOSIS — G20 PARKINSON'S DISEASE: ICD-10-CM

## 2023-11-08 PROCEDURE — 72110 X-RAY EXAM L-2 SPINE 4/>VWS: CPT

## 2023-11-08 PROCEDURE — 72070 X-RAY EXAM THORAC SPINE 2VWS: CPT

## 2023-11-08 PROCEDURE — 99205 OFFICE O/P NEW HI 60 MIN: CPT

## 2023-11-08 PROCEDURE — 72170 X-RAY EXAM OF PELVIS: CPT

## 2023-11-08 RX ORDER — ROSUVASTATIN CALCIUM 20 MG/1
20 TABLET, FILM COATED ORAL
Refills: 0 | Status: ACTIVE | COMMUNITY

## 2023-11-29 ENCOUNTER — APPOINTMENT (OUTPATIENT)
Dept: ORTHOPEDIC SURGERY | Facility: CLINIC | Age: 73
End: 2023-11-29

## 2023-11-30 ENCOUNTER — APPOINTMENT (OUTPATIENT)
Dept: NEUROLOGY | Facility: CLINIC | Age: 73
End: 2023-11-30

## 2023-12-06 ENCOUNTER — RESULT REVIEW (OUTPATIENT)
Age: 73
End: 2023-12-06

## 2023-12-15 ENCOUNTER — APPOINTMENT (OUTPATIENT)
Dept: NEUROLOGY | Facility: CLINIC | Age: 73
End: 2023-12-15
Payer: MEDICARE

## 2023-12-15 ENCOUNTER — APPOINTMENT (OUTPATIENT)
Dept: ORTHOPEDIC SURGERY | Facility: CLINIC | Age: 73
End: 2023-12-15

## 2023-12-15 DIAGNOSIS — R41.3 OTHER AMNESIA: ICD-10-CM

## 2023-12-15 PROCEDURE — 99214 OFFICE O/P EST MOD 30 MIN: CPT

## 2023-12-15 NOTE — DISCUSSION/SUMMARY
[FreeTextEntry1] : In summary, Mr. Bynum is a 72 year old right handed man with Parkinson's disease diagnosed in 2014, who comes for follow up. He states since starting Artane, his tremor has improved as well as his energy and walking difficulties. However, he is not taking the Sinemet as prescribed, and is only taking it up to two times a day. He reports compliance with daily Rasagiline and Trihexyphenidyl. No recent falls, no constipation. Does have some trouble falling asleep due to increased stress at home.  Recommendations: 1. Continue Sinemet 25/250 1 tab 5-6x/day. Does have some OFF pain, could consider DBS. Can hold off on selegiline and artane for now, has also been on rasagiline, Continue Ongentys at night. 2. Can have DBS/FUS evaluation for tremors, though memory may be a concern. Tremor better 3. 0.25mg Xanax at night as needed, has not needed.  We discussed the above impression, plan and recommendations during the visit. Counseling represented more then 50% of the 30 minute visit time

## 2023-12-15 NOTE — PHYSICAL EXAM
[Person] : oriented to person [Place] : oriented to place [Time] : oriented to time [Short Term Intact] : short term memory intact [Registration Intact] : recent registration memory intact [Naming Objects] : no difficulty naming common objects [Fluency] : fluency intact [Cranial Nerves Optic (II)] : visual acuity intact bilaterally,  visual fields full to confrontation, pupils equal round and reactive to light [Cranial Nerves Oculomotor (III)] : extraocular motion intact [Cranial Nerves Facial (VII)] : face symmetrical [Cranial Nerves Vestibulocochlear (VIII)] : hearing was intact bilaterally [Cranial Nerves Glossopharyngeal (IX)] : tongue and palate midline [Motor Strength] : muscle strength was normal in all four extremities [Motor Handedness Right-Handed] : the patient is right hand dominant [FreeTextEntry1] : Last sinemet and gabapentin 2 hours ago  Facial expression is decreased as well as blinking rate. Speech is normal. Extraocular movements were intact with no square wave jerks seen, but some impairment of smooth pursuit.    Rigidity in LUE upon activation, no neck rigidity. Minimal bradykinesia in left side more than right in fingers and foot taps. Did not try to get up without arms (back pain).   Gait with mildly stooped posture, minimally decreased right arm swing today. Good stride. Pull test was negative,   No resting tremor.  Mild LID

## 2023-12-15 NOTE — HISTORY OF PRESENT ILLNESS
[FreeTextEntry1] : Patient is a 73 year old right handed man who was diagnosed with Parkinson's disease in 2014, when he started with difficulty writing, difficulty with gait, and decreased right arm swing.   Hospitalized last September for kidney failure, on dialysis for a month, then rehab, now home. Kidneys recovered.   1.  Back pain continues to be the big issues, worse when standing. Has a spinal cord stimulator, due to be removed. Used to see pain management, now on duloxetine 60 mg daily, and gabapentin 300 mg tid. Seeing pain management, awaiting transfer of records. Had MRI last week. I don't have report.  2. Taking Sinemet 25/250 5-6x/day now (spread evenly between getting up and bedtime).  This improved pain, walking.  No motor fluctuations, no dyskinesias. ADLs independent. Last visit we added ongentys, he is not sure if he is taking it.  3. Had added Artane (bid) for tremor, helping with no side effects. The tremors have improved since starting Artane. No swallowing difficulties or choking episode. Quick hallucinations (fleeting). Artane and selegiline were stopped at night.  4. Tries to exercise once a week. Exercises in the house. No speech difficulty. Stopped working. PT/OT come to house. Sleeping better with Xanax and he uses it when he needs it but has not had a prescription in a while. No acting out of his dreams but does have vivid dreaming. Mood is ok. Does report increased stress from finances and his ill wife (has been in rehab for 5 months, now needs knee surgery) 5. Changes in memory of general events, forgetful of things he has to do since over the last year, no changes since starting Artane. Had one B12 injection, and taking supplements now (though it seems to be vitamin D)

## 2023-12-21 ENCOUNTER — APPOINTMENT (OUTPATIENT)
Dept: ORTHOPEDIC SURGERY | Facility: HOSPITAL | Age: 73
End: 2023-12-21

## 2024-01-05 ENCOUNTER — APPOINTMENT (OUTPATIENT)
Dept: ORTHOPEDIC SURGERY | Facility: CLINIC | Age: 74
End: 2024-01-05

## 2024-01-05 DIAGNOSIS — M54.50 LOW BACK PAIN, UNSPECIFIED: ICD-10-CM

## 2024-01-05 NOTE — DATA REVIEWED
[CT Scan] : CT scan [MRI] : MRI [Lumbar Spine] : lumbar spine [Report was reviewed and noted in the chart] : The report was reviewed and noted in the chart [I independently reviewed and interpreted images and report] : I independently reviewed and interpreted images and report

## 2024-01-08 PROBLEM — M54.50 LOWER BACK PAIN: Status: ACTIVE | Noted: 2022-04-05

## 2024-01-08 NOTE — HISTORY OF PRESENT ILLNESS
[Lower back] : lower back [de-identified] : note entered in error [FreeTextEntry5] : MADDY is here today to follow up on his lower back.

## 2024-01-10 ENCOUNTER — APPOINTMENT (OUTPATIENT)
Dept: ORTHOPEDIC SURGERY | Facility: CLINIC | Age: 74
End: 2024-01-10

## 2024-03-13 ENCOUNTER — OFFICE (OUTPATIENT)
Dept: URBAN - METROPOLITAN AREA CLINIC 104 | Facility: CLINIC | Age: 74
Setting detail: OPHTHALMOLOGY
End: 2024-03-13
Payer: MEDICARE

## 2024-03-13 DIAGNOSIS — H16.223: ICD-10-CM

## 2024-03-13 DIAGNOSIS — E11.9: ICD-10-CM

## 2024-03-13 DIAGNOSIS — H25.13: ICD-10-CM

## 2024-03-13 DIAGNOSIS — H25.813: ICD-10-CM

## 2024-03-13 PROCEDURE — 92004 COMPRE OPH EXAM NEW PT 1/>: CPT | Performed by: OPTOMETRIST

## 2024-04-16 ENCOUNTER — NON-APPOINTMENT (OUTPATIENT)
Age: 74
End: 2024-04-16

## 2024-04-16 ENCOUNTER — APPOINTMENT (OUTPATIENT)
Dept: NEUROSURGERY | Facility: CLINIC | Age: 74
End: 2024-04-16
Payer: MEDICARE

## 2024-04-16 VITALS
HEART RATE: 66 BPM | WEIGHT: 200 LBS | HEIGHT: 73 IN | DIASTOLIC BLOOD PRESSURE: 71 MMHG | SYSTOLIC BLOOD PRESSURE: 117 MMHG | BODY MASS INDEX: 26.51 KG/M2 | OXYGEN SATURATION: 97 %

## 2024-04-16 PROCEDURE — 99205 OFFICE O/P NEW HI 60 MIN: CPT

## 2024-04-16 NOTE — REASON FOR VISIT
[New Patient Visit] : a new patient visit [Referred By: _________] : Patient was referred by LUZ ELENA

## 2024-04-19 NOTE — HISTORY OF PRESENT ILLNESS
[de-identified] : MADDY CALLAWAY is a 73 year old right handed male with PMH of Parkinson's disease, DM2, HLD, spinal stenosis, arthritis, kidney failure 6-8 months ago requiring dialysis-no longer needed, anxiety. He takes baby aspirin daily for prevention. He presents to the office for neurosurgical consultation for high intensity focused ultrasound. He was diagnosed with PD in 2014 when he was having tremor b/l. His symptoms have worsened over the last 10 years. He was started on levodopa with good response initially. Now he take carbidopa-levodopa  1 tab q2h (5-6x daily). He now has severe on/off motor fluctuations. He wears off about 2 hours after dose with worsening of tremor. He has dyskinesia daily mostly when wearing off. Tremor is his most bothersome symptom. Denies hallucinations, suicidal ideation. Has some dysphagia occasionally.  Denies freezing of gait.  He has an aide 28 hours per week. He uses rolling walker, his  balance is ok, it is getting harder to walk up the steps.  Has some falls.   Current Parkinson's medication: carbidopa-levodopa  mg tablet 5x daily (about every 2 hours) (sometimes 6 or 7 doses daily) Not taking Ongentys

## 2024-04-19 NOTE — ASSESSMENT
[FreeTextEntry1] : IMPRESSION: 73M with PMH severe PD, DM2, HLD, spinal stenosis, arthritis, kidney failure 6-8 months ago, anxiety. On ASA 81 mg daily. He was dx with PD in 2014 now having severe on/off motor fluctuations. Has wearing offs and dyskinesia daily. Tremor is most bothersome symptom. His symptoms are affecting his quality of life and ability to perform ADLs.  Pt should be referred to neurology for CAPSIT and neuropsychological evaluation. I think patient could be a good candidate for b/l STN-DBS based on his symptoms pending result of comprehensive evaluation and interdisciplinary meeting.  I have discussed this patients symptoms with them. I have discussed how the motor symptoms of the patients Parkinson's disease is disabling. The symptoms significantly interfere with their quality of life. I think that this patient is a good candidate for bilateral deep brain stimulation of the subthalamic nucleus.  He has had inadequate control of his motor symptoms for >6 months with the use of medication at the maximum dose, which has also caused him disabling side effects that led him to not tolerate such high dose of medication.  I have discussed in detail the process of the surgery. I discussed that the surgery will take place in two different stages, Stage I and Stage II that it will take place at [facility]. The patient would be under local anesthesia and would have a stereotactic frame placed around the head prior to having a head CT stealth done before proceeding with the placement of the electrodes. The patient would be awake during stage 1 surgery to participate in assessments in order to optimize placement of the electrode for greatest benefit and smallest side effect. The lead can then be adjusted in the operating room if needed. The patient would have a routine post op head CT again after surgery and would spend the night, and would likely go home the following day with PO pain meds, and about a week of scheduled antibiotics.  The patient would return to the hospital ~ 2 weeks later for the Stage II procedure, which would be the subcutaneous implantation of the pulse generator and lead extension and would be done under general anesthesia. This would be an outpatient procedure and the patient would go home that same day along with pain meds and scheduled antibiotics for ~1 week.  I discussed the risk and benefits of the procedure including bleeding, infection, seizures, stroke, heart or lung issues, no benefit, the need for reoperations, hardware malfunction or failure, and sustained side effects including paralysis, paresthesias, eye deviations, mood, changes, and voice changes. I also discussed the need for several programing sessions afterwards.  The patient shows good understanding of the procedure, the risk and benefits, and wishes to proceed with the procedure as soon as possible.   PLAN: -Referral to neuropsychology -Referral to movement disorder neurology for CAPSIT -Must stop aspirin 7 days prior to surgery -PST and medical clearance needed.  If pt chooses to proceed with DBS the following imaging will be ordered for surgical planning, target determination,  and tractography: -CT brain, stealth -3T Siemens [place], Research magnet, MRI brain with/without contrast, DBS protocol  Angeli WHITLOCK, Dr. Gómez Suh, personally performed the evaluation and management (E/M) services for this new patient.  That E/M includes conducting the clinically appropriate initial history &/or exam, assessing all conditions, and establishing the plan of care.  Today, my REBEKAH, [employer], was here to observe my evaluation and management service for this patient & follow plan of care established by me going forward.

## 2024-04-29 ENCOUNTER — OFFICE (OUTPATIENT)
Dept: URBAN - METROPOLITAN AREA CLINIC 104 | Facility: CLINIC | Age: 74
Setting detail: OPHTHALMOLOGY
End: 2024-04-29
Payer: MEDICARE

## 2024-04-29 ENCOUNTER — RX ONLY (RX ONLY)
Age: 74
End: 2024-04-29

## 2024-04-29 DIAGNOSIS — E11.9: ICD-10-CM

## 2024-04-29 DIAGNOSIS — H25.811: ICD-10-CM

## 2024-04-29 DIAGNOSIS — H25.813: ICD-10-CM

## 2024-04-29 DIAGNOSIS — H16.223: ICD-10-CM

## 2024-04-29 PROBLEM — H25.812 CATARACT SENILE NUCLEAR SCLEROSIS; RIGHT EYE, LEFT EYE, BOTH EYES: Status: ACTIVE | Noted: 2024-04-29

## 2024-04-29 PROCEDURE — 92136 OPHTHALMIC BIOMETRY: CPT | Mod: 26,RT | Performed by: OPHTHALMOLOGY

## 2024-04-29 PROCEDURE — 99213 OFFICE O/P EST LOW 20 MIN: CPT | Performed by: OPHTHALMOLOGY

## 2024-04-29 PROCEDURE — 92136 OPHTHALMIC BIOMETRY: CPT | Mod: TC | Performed by: OPHTHALMOLOGY

## 2024-04-30 ENCOUNTER — APPOINTMENT (OUTPATIENT)
Dept: NEUROLOGY | Facility: CLINIC | Age: 74
End: 2024-04-30

## 2024-05-06 ENCOUNTER — APPOINTMENT (OUTPATIENT)
Dept: NEUROLOGY | Facility: CLINIC | Age: 74
End: 2024-05-06

## 2024-05-17 ENCOUNTER — APPOINTMENT (OUTPATIENT)
Dept: NEUROLOGY | Facility: CLINIC | Age: 74
End: 2024-05-17
Payer: MEDICARE

## 2024-05-17 DIAGNOSIS — G20.A1 PARKINSON'S DISEASE WITHOUT DYSKINESIA, WITHOUT MENTION OF FLUCTUATIONS: ICD-10-CM

## 2024-05-17 DIAGNOSIS — F41.8 OTHER SPECIFIED ANXIETY DISORDERS: ICD-10-CM

## 2024-05-17 PROCEDURE — 99214 OFFICE O/P EST MOD 30 MIN: CPT

## 2024-05-17 PROCEDURE — G2211 COMPLEX E/M VISIT ADD ON: CPT

## 2024-05-17 NOTE — DISCUSSION/SUMMARY
[FreeTextEntry1] : In summary, Mr. Bynum is a 73 year old right handed man with Parkinson's disease diagnosed in 2014, who comes for follow up. He states since starting Artane, his tremor has improved as well as his energy and walking difficulties. However, he is not taking the Sinemet as prescribed, and is only taking it up to two times a day. He reports compliance with daily Rasagiline and Trihexyphenidyl. No recent falls, no constipation. Does have some trouble falling asleep due to increased stress at home.  Recommendations: 1. Continue Sinemet 25/250 1 tab 5-6x/day. Does have some OFF pain,  2. Formal DBS/FUS evaluation for tremors, though memory may be a concern. Tremor better 3. 0.25mg Xanax at night as needed, has not needed. 4. Back pain - being evaluated for back surgery   We discussed the above impression, plan and recommendations during the visit. Counseling represented more then 50% of the 30 minute visit time

## 2024-05-17 NOTE — HISTORY OF PRESENT ILLNESS
[FreeTextEntry1] : Patient is a 73 year old right handed man who was diagnosed with Parkinson's disease in 2014, when he started with difficulty writing, difficulty with gait, and decreased right arm swing.   Hospitalized last September for kidney failure, on dialysis for a month, then rehab, now home. Kidneys recovered.   1. Saw Dr Suh, who recommended CAPSIT and formal evaluation 2. Back pain continues to be the big issues, worse when standing. Has a spinal cord stimulator, due to be removed, has not happened yet. Used to see pain management, now on duloxetine 60 mg daily, and gabapentin 300 mg tid. Seeing pain management, awaiting transfer of records. Recent MRI showed spinal stenosis (report not in system)  3. Taking Sinemet 25/250 5-6x/day now (spread evenly between getting up and bedtime).  This improved pain, walking.  No motor fluctuations, and with LID now. ADLs independent. Last visit we added ongentys, he is not sure if he is taking it. Never started ongentys 4. Artane and selegiline were stopped 5. Tries to exercise once a week. Exercises in the house. No speech difficulty. Stopped working. PT/OT come to house. Sleeping better with Xanax and he uses it when he needs it but has not had a prescription in a while. No acting out of his dreams but does have vivid dreaming. Mood is ok. Does report increased stress from finances and his ill wife (has been in rehab for 5 months, now needs knee surgery) 5. Changes in memory of general events, forgetful of things he has to do since over the last year, no changes since starting Artane. Had one B12 injection, and taking supplements now (though it seems to be vitamin D)

## 2024-05-17 NOTE — PHYSICAL EXAM
[Person] : oriented to person [Place] : oriented to place [Time] : oriented to time [Short Term Intact] : short term memory intact [Registration Intact] : recent registration memory intact [Naming Objects] : no difficulty naming common objects [Fluency] : fluency intact [Cranial Nerves Optic (II)] : visual acuity intact bilaterally,  visual fields full to confrontation, pupils equal round and reactive to light [Cranial Nerves Oculomotor (III)] : extraocular motion intact [Cranial Nerves Facial (VII)] : face symmetrical [Cranial Nerves Vestibulocochlear (VIII)] : hearing was intact bilaterally [Cranial Nerves Glossopharyngeal (IX)] : tongue and palate midline [Motor Strength] : muscle strength was normal in all four extremities [Motor Handedness Right-Handed] : the patient is right hand dominant [FreeTextEntry1] : Last sinemet and gabapentin 5 hours ago  Facial expression is decreased as well as blinking rate. Speech is normal. Extraocular movements were intact with no square wave jerks seen, but some impairment of smooth pursuit.    Rigidity in LUE upon activation, no neck rigidity. Minimal bradykinesia in left side more than right in fingers and foot taps. Did not try to get up without arms (back pain).   Gait with mildly stooped posture, minimally decreased right arm swing today. Good stride. Pull test was positive today (took 5 steps)  No resting tremor.  No LID

## 2024-06-19 ENCOUNTER — ASC (OUTPATIENT)
Dept: URBAN - METROPOLITAN AREA SURGERY 8 | Facility: SURGERY | Age: 74
Setting detail: OPHTHALMOLOGY
End: 2024-06-19
Payer: MEDICARE

## 2024-06-19 DIAGNOSIS — H25.11: ICD-10-CM

## 2024-06-19 PROCEDURE — 66984 XCAPSL CTRC RMVL W/O ECP: CPT | Mod: RT | Performed by: OPHTHALMOLOGY

## 2024-06-20 ENCOUNTER — RX ONLY (RX ONLY)
Age: 74
End: 2024-06-20

## 2024-06-20 ENCOUNTER — OFFICE (OUTPATIENT)
Dept: URBAN - METROPOLITAN AREA CLINIC 104 | Facility: CLINIC | Age: 74
Setting detail: OPHTHALMOLOGY
End: 2024-06-20
Payer: MEDICARE

## 2024-06-20 DIAGNOSIS — Z96.1: ICD-10-CM

## 2024-06-20 PROCEDURE — 99024 POSTOP FOLLOW-UP VISIT: CPT | Performed by: OPTOMETRIST

## 2024-06-20 ASSESSMENT — CONFRONTATIONAL VISUAL FIELD TEST (CVF)
OD_FINDINGS: FULL
OS_FINDINGS: FULL

## 2024-06-27 ENCOUNTER — OFFICE (OUTPATIENT)
Dept: URBAN - METROPOLITAN AREA CLINIC 104 | Facility: CLINIC | Age: 74
Setting detail: OPHTHALMOLOGY
End: 2024-06-27
Payer: MEDICARE

## 2024-06-27 DIAGNOSIS — Z96.1: ICD-10-CM

## 2024-06-27 DIAGNOSIS — H25.12: ICD-10-CM

## 2024-06-27 PROCEDURE — 99024 POSTOP FOLLOW-UP VISIT: CPT | Performed by: OPTOMETRIST

## 2024-06-27 PROCEDURE — 92136 OPHTHALMIC BIOMETRY: CPT | Mod: LT | Performed by: OPHTHALMOLOGY

## 2024-06-28 ENCOUNTER — RX ONLY (RX ONLY)
Age: 74
End: 2024-06-28

## 2024-06-28 ENCOUNTER — ASC (OUTPATIENT)
Dept: URBAN - METROPOLITAN AREA SURGERY 8 | Facility: SURGERY | Age: 74
Setting detail: OPHTHALMOLOGY
End: 2024-06-28
Payer: MEDICARE

## 2024-06-28 DIAGNOSIS — H25.12: ICD-10-CM

## 2024-06-28 PROCEDURE — 66984 XCAPSL CTRC RMVL W/O ECP: CPT | Mod: 79,LT | Performed by: OPHTHALMOLOGY

## 2024-06-29 ENCOUNTER — OFFICE (OUTPATIENT)
Dept: URBAN - METROPOLITAN AREA CLINIC 104 | Facility: CLINIC | Age: 74
Setting detail: OPHTHALMOLOGY
End: 2024-06-29
Payer: MEDICARE

## 2024-06-29 DIAGNOSIS — Z96.1: ICD-10-CM

## 2024-06-29 PROBLEM — H25.12 CATARACT SENILE NUCLEAR SCLEROSIS; LEFT EYE,: Status: RESOLVED | Noted: 2024-06-20 | Resolved: 2024-06-29

## 2024-06-29 PROCEDURE — 99024 POSTOP FOLLOW-UP VISIT: CPT | Performed by: OPTOMETRIST

## 2024-06-29 ASSESSMENT — CONFRONTATIONAL VISUAL FIELD TEST (CVF)
OD_FINDINGS: FULL
OS_FINDINGS: FULL

## 2024-07-02 ENCOUNTER — OFFICE (OUTPATIENT)
Dept: URBAN - METROPOLITAN AREA CLINIC 104 | Facility: CLINIC | Age: 74
Setting detail: OPHTHALMOLOGY
End: 2024-07-02
Payer: MEDICARE

## 2024-07-02 DIAGNOSIS — Z96.1: ICD-10-CM

## 2024-07-02 PROCEDURE — 99024 POSTOP FOLLOW-UP VISIT: CPT | Performed by: OPTOMETRIST

## 2024-07-02 ASSESSMENT — CONFRONTATIONAL VISUAL FIELD TEST (CVF)
OD_FINDINGS: FULL
OS_FINDINGS: FULL

## 2024-07-25 ENCOUNTER — APPOINTMENT (OUTPATIENT)
Dept: NEUROLOGY | Facility: CLINIC | Age: 74
End: 2024-07-25
Payer: MEDICARE

## 2024-07-25 PROCEDURE — 99214 OFFICE O/P EST MOD 30 MIN: CPT

## 2024-07-26 NOTE — PHYSICAL EXAM
[General Appearance - Alert] : alert [Oriented To Time, Place, And Person] : oriented to person, place, and time [Person] : oriented to person [Place] : oriented to place [Time] : oriented to time [Registration Intact] : recent registration memory intact [Cranial Nerves Optic (II)] : visual acuity intact bilaterally,  visual fields full to confrontation, pupils equal round and reactive to light [Cranial Nerves Oculomotor (III)] : extraocular motion intact [Cranial Nerves Facial (VII)] : face symmetrical [Cranial Nerves Vestibulocochlear (VIII)] : hearing was intact bilaterally [Cranial Nerves Glossopharyngeal (IX)] : tongue and palate midline [Motor Strength] : muscle strength was normal in all four extremities [Motor Handedness Right-Handed] : the patient is right hand dominant [FreeTextEntry1] : +2 Facial hypomimia, reduced blink rate Vertical eye movements intact without square wave jerks +3 Hypophonic speech +2 Rigidity of neck rotation +2 L +1R Rigidity of limbs present with contralateral activation  +2 LUE Resting tremor 0 Action tremor 0 Postural tremor  +2 L>R Bradykinesia with finger tapping, hand supination/pronation, +2 foot tapping, foot stomping. No dysmetria with finger to nose Gait: able to stand with hands crossed slowly +2 Flexed posture slow gait initiation, decreased stride length, shuffles, reduced arm swing with activation of left hand tremor, no freezing of gait upon turning, requires multiple steps with turning. recovers independently with pull test   OFF

## 2024-07-26 NOTE — HISTORY OF PRESENT ILLNESS
[FreeTextEntry1] : 74 year old male presents for DBS consultation. He was diagnosed with Parkinson's disease in 2014, when he started with difficulty writing, difficulty with gait, and decreased right arm swing.   [place] [phone] aid and point of contact   Current medications Sinemet 25/250 1 tab 5 times daily   Past meds Bryanna Downs

## 2024-07-26 NOTE — HISTORY OF PRESENT ILLNESS
[FreeTextEntry1] : 74 year old male presents for DBS consultation. He was diagnosed with Parkinson's disease in 2014, when he started with difficulty writing, difficulty with gait, and decreased right arm swing.   [place] [phone] aid and point of contact   Current medications Sinemet 25/250 1 tab 5 times daily   Past meds Bryanna Downs

## 2024-07-26 NOTE — HISTORY OF PRESENT ILLNESS
[FreeTextEntry1] : 74 year old male presents for DBS consultation. He was diagnosed with Parkinson's disease in 2014, when he started with difficulty writing, difficulty with gait, and decreased right arm swing.   [place] [phone] aid and point of contact   Current medications Sinemet 25/250 1 tab 5 times daily   Past meds Bryanna Downs

## 2024-07-26 NOTE — DISCUSSION/SUMMARY
[FreeTextEntry1] : 74-year-old male with hx of PD x 8 years with motor fluctuations presents for DBS consultation. I explained the indications for DBS and expectations. DBS is not intended to cure PD but is used as an advanced therapy for motor fluctuations which he currently experiences. Will discuss medication regimen further at the time of capsit.  Previously no showed to NPT. Will refer again based on outcome of on/off testing   Patient to present on 8/5/24 for on/off. patient advised to hold bedtime and am dose of L-dopa for the purpose of the test.

## 2024-07-26 NOTE — HISTORY OF PRESENT ILLNESS
[FreeTextEntry1] : 74 year old male presents for DBS consultation. He was diagnosed with Parkinson's disease in 2014, when he started with difficulty writing, difficulty with gait, and decreased right arm swing.   [place] [phone] aid and point of contact   Current medications Sinemet 25/250 1 tab 5 times daily   Past meds Bryanna Downs

## 2024-07-26 NOTE — HISTORY OF PRESENT ILLNESS
[FreeTextEntry1] : 74 year old male presents for DBS consultation. He was diagnosed with Parkinson's disease in 2014, when he started with difficulty writing, difficulty with gait, and decreased right arm swing.   [place] [phone] aid and point of contact   Current medications Sinemet 25/250 1 tab 5 times daily   Past meds Bryanna Downs

## 2024-07-29 ENCOUNTER — OFFICE (OUTPATIENT)
Dept: URBAN - METROPOLITAN AREA CLINIC 104 | Facility: CLINIC | Age: 74
Setting detail: OPHTHALMOLOGY
End: 2024-07-29
Payer: MEDICARE

## 2024-07-29 DIAGNOSIS — Z96.1: ICD-10-CM

## 2024-07-29 PROCEDURE — 99024 POSTOP FOLLOW-UP VISIT: CPT | Performed by: OPTOMETRIST

## 2024-07-29 ASSESSMENT — CONFRONTATIONAL VISUAL FIELD TEST (CVF)
OD_FINDINGS: FULL
OS_FINDINGS: FULL

## 2024-08-05 ENCOUNTER — APPOINTMENT (OUTPATIENT)
Dept: NEUROLOGY | Facility: CLINIC | Age: 74
End: 2024-08-05

## 2024-08-05 PROCEDURE — 99215 OFFICE O/P EST HI 40 MIN: CPT

## 2024-08-07 NOTE — HISTORY OF PRESENT ILLNESS
[FreeTextEntry1] : Bharath   74-year-old male with PD x 10 years presents for pre-surgical testing. He was diagnosed with Parkinson's disease in 2014, when he started with difficulty writing, difficulty with gait, and decreased right arm swing.  Patient experiences motor fluctuations including levodopa-sensitive off symptoms in the last 2 years. Medication kicks in after 20 minutes and begins to wear off after 3 hours. Patient reports dyskinesia as his dose wears off. He feels slower, gait is worse, and voice is lower in the off state.   Patient uses a rollator for the last year. Patient last fell 1 week ago, lost his baalnce with the rollator, denies injury. Not currently exercising, not doing PT/OT. Patient reports fatigue and depression leading him to be withdrawn. Has a son in [place] and 2 brothers. Does not talk to them often    Last dose of levodopa was taken yesterday at 10pm   Current medications Sinemet 25/250 1-1-1-2 q4h Duloxetine  Hydrocodone uses a couple times a month   Social: , wife is sick, wheelchair bound.   Past medications  Yareli turner

## 2024-08-07 NOTE — DISCUSSION/SUMMARY
[FreeTextEntry1] : 74 year old male with PD x 10 years completed presurgical assessment. On today's CAPSIT, the patient demonstrated a 24% improvement in his MDS-UPDRS part III assessment.  The patient was informed of programming protocols and possible AE's of stimulation.  The patient has motor fluctuations which have been worsening in the last 2 years. He reports significant interference in his ADLs due to symptoms including dyskinesia and bradykinesia. There is significant depression also factoring into his ability to exercise. He reports being fairly sedentary, he has chronic back pain, significant fatigue, difficult home situation and little social support.  Patient was counseled on the following recommendations -Discussed the option of upcoming sub q LD pump -Advised patient to switch LD 25/250 regimen to 1 tablet every 2.5-3 hours -Encouraged to increase exercise and physical activity and maintain an active social and intellectual life. Declined PT script.   f/u with Dr Lu as instructed

## 2024-08-07 NOTE — PHYSICAL EXAM
[General Appearance - Alert] : alert [Oriented To Time, Place, And Person] : oriented to person, place, and time [Cranial Nerves Oculomotor (III)] : extraocular motion intact [FreeTextEntry1] : OFF state - MDS UPDRS III score:  37  +2 facial masking, +2 hypophonia. +2 neck rigidity. There is moderate L>R bradykinesia with dyskinesia interfering with LYDIA assessment. Arises slowly with arms crossed. Slow gait initiation, slightly drags left leg, multi-step turns, and absent arm swing. +2 posture, unable to recover from pull test independently. 0 RT, PT or KT. Dyskinesia present on the right kyrie body more than the left.    ON state - MDS UPRDS III score:  28  It took 40 min for 300mg of LD to take effect   0 tremor. Bradykinesia demonstrated mild improvement. mild dyskinetic movements observed in the neck and RLE with distraction. +2 posture, increased SL, slightly improved arm swing and improved turns. Unable to recover from pull test.  MDS UPRDS I: 21 MDS UPRDS II: 19 MDS UPRDS IV: 9

## 2024-08-20 ENCOUNTER — APPOINTMENT (OUTPATIENT)
Dept: NEUROLOGY | Facility: CLINIC | Age: 74
End: 2024-08-20
Payer: MEDICARE

## 2024-08-20 PROCEDURE — 96139 PSYCL/NRPSYC TST TECH EA: CPT | Mod: NC

## 2024-08-20 PROCEDURE — 96133 NRPSYC TST EVAL PHYS/QHP EA: CPT

## 2024-08-20 PROCEDURE — 96138 PSYCL/NRPSYC TECH 1ST: CPT | Mod: NC

## 2024-08-20 PROCEDURE — 96132 NRPSYC TST EVAL PHYS/QHP 1ST: CPT

## 2024-08-20 PROCEDURE — 96116 NUBHVL XM PHYS/QHP 1ST HR: CPT

## 2024-10-10 ENCOUNTER — APPOINTMENT (OUTPATIENT)
Dept: NEUROLOGY | Facility: CLINIC | Age: 74
End: 2024-10-10

## 2024-11-07 ENCOUNTER — APPOINTMENT (OUTPATIENT)
Dept: NEUROLOGY | Facility: CLINIC | Age: 74
End: 2024-11-07

## 2024-12-11 ENCOUNTER — APPOINTMENT (OUTPATIENT)
Dept: NEUROLOGY | Facility: CLINIC | Age: 74
End: 2024-12-11

## 2024-12-24 ENCOUNTER — NON-APPOINTMENT (OUTPATIENT)
Age: 74
End: 2024-12-24

## 2025-01-22 ENCOUNTER — APPOINTMENT (OUTPATIENT)
Dept: NEUROLOGY | Facility: CLINIC | Age: 75
End: 2025-01-22
Payer: MEDICARE

## 2025-01-22 ENCOUNTER — EMERGENCY (EMERGENCY)
Facility: HOSPITAL | Age: 75
LOS: 1 days | Discharge: HOME | End: 2025-01-22
Attending: STUDENT IN AN ORGANIZED HEALTH CARE EDUCATION/TRAINING PROGRAM
Payer: MEDICARE

## 2025-01-22 VITALS
HEART RATE: 75 BPM | WEIGHT: 152 LBS | SYSTOLIC BLOOD PRESSURE: 104 MMHG | DIASTOLIC BLOOD PRESSURE: 64 MMHG | BODY MASS INDEX: 20.15 KG/M2 | HEIGHT: 73 IN

## 2025-01-22 VITALS
SYSTOLIC BLOOD PRESSURE: 143 MMHG | DIASTOLIC BLOOD PRESSURE: 69 MMHG | TEMPERATURE: 98 F | HEART RATE: 76 BPM | RESPIRATION RATE: 20 BRPM | OXYGEN SATURATION: 95 %

## 2025-01-22 VITALS
OXYGEN SATURATION: 98 % | DIASTOLIC BLOOD PRESSURE: 69 MMHG | SYSTOLIC BLOOD PRESSURE: 114 MMHG | HEART RATE: 80 BPM | TEMPERATURE: 100 F | RESPIRATION RATE: 18 BRPM

## 2025-01-22 DIAGNOSIS — G20.A1 PARKINSON'S DISEASE WITHOUT DYSKINESIA, WITHOUT MENTION OF FLUCTUATIONS: ICD-10-CM

## 2025-01-22 DIAGNOSIS — T42.8X5A DRUG INDUCED SUBACUTE DYSKINESIA: ICD-10-CM

## 2025-01-22 DIAGNOSIS — G24.01 DRUG INDUCED SUBACUTE DYSKINESIA: ICD-10-CM

## 2025-01-22 DIAGNOSIS — F41.8 OTHER SPECIFIED ANXIETY DISORDERS: ICD-10-CM

## 2025-01-22 PROCEDURE — 99284 EMERGENCY DEPT VISIT MOD MDM: CPT | Mod: 25

## 2025-01-22 PROCEDURE — 99204 OFFICE O/P NEW MOD 45 MIN: CPT

## 2025-01-22 PROCEDURE — 72125 CT NECK SPINE W/O DYE: CPT | Mod: MC

## 2025-01-22 PROCEDURE — 99284 EMERGENCY DEPT VISIT MOD MDM: CPT

## 2025-01-22 PROCEDURE — 72125 CT NECK SPINE W/O DYE: CPT | Mod: 26

## 2025-01-22 PROCEDURE — 70450 CT HEAD/BRAIN W/O DYE: CPT | Mod: 26

## 2025-01-22 PROCEDURE — 82962 GLUCOSE BLOOD TEST: CPT

## 2025-01-22 PROCEDURE — G2211 COMPLEX E/M VISIT ADD ON: CPT

## 2025-01-22 PROCEDURE — 70450 CT HEAD/BRAIN W/O DYE: CPT | Mod: MC

## 2025-01-22 RX ORDER — LEVODOPA AND CARBIDOPA 145; 36.25 MG/1; MG/1
1 CAPSULE, EXTENDED RELEASE ORAL ONCE
Refills: 0 | Status: COMPLETED | OUTPATIENT
Start: 2025-01-22 | End: 2025-01-22

## 2025-01-22 RX ORDER — CARBIDOPA AND LEVODOPA 25; 100 MG/1; MG/1
25-100 TABLET ORAL
Qty: 360 | Refills: 5 | Status: ACTIVE | COMMUNITY
Start: 2025-01-22 | End: 1900-01-01

## 2025-01-22 RX ADMIN — LEVODOPA AND CARBIDOPA 1 TABLET(S): 145; 36.25 CAPSULE, EXTENDED RELEASE ORAL at 03:24

## 2025-01-22 NOTE — ED ADULT NURSE REASSESSMENT NOTE - NS ED NURSE REASSESS COMMENT FT1
patient asking to ambulate to bathroom on arrival, upset with staff because of safety risk with two falls and bradykinesia, patient assisted with urinal, fall bracelet band placed on .

## 2025-01-22 NOTE — ED PROVIDER NOTE - PATIENT PORTAL LINK FT
You can access the FollowMyHealth Patient Portal offered by [facility] by registering at the following website: [email or web address]. By joining [employer]’s FollowMyHealth portal, you will also be able to view your health information using other applications (apps) compatible with our system.

## 2025-01-22 NOTE — ED ADULT NURSE NOTE - NSFALLRISKINTERV_ED_ALL_ED

## 2025-01-22 NOTE — ED PROVIDER NOTE - CLINICAL SUMMARY MEDICAL DECISION MAKING FREE TEXT BOX
On arrival patient alert and oriented to time/place. Answering questions appropriately. Given unwitnessed nature of fall will warrant CT imaging head/neck to rule out ICH/fracture. Given home meds at the ED. Will reassess once imaging results.

## 2025-01-22 NOTE — ED PROVIDER NOTE - PROGRESS NOTE DETAILS
CT negative. Patient asymptomatic. No signs of trauma. Stable for discharge. Will set up ambulance  Samer Jose Patel MD

## 2025-01-22 NOTE — ED PROVIDER NOTE - ATTENDING CONTRIBUTION TO CARE
74 year old male w/PMHx DM, parkinson's presents to the ED after a fall. States he tripped over a rug and fell to the floor. Initially wife called 911 however patient refused medical assistance. This was followed by a second fall over the same rug and brought in to the ED for eval. Currently patient requesting to go home, denies any headache, vision changes, numbness tingling or weakness. Uses a walker at baseline. Missed his parkinson's bedtime meds and requesting a dosage here - on 25/250 carbidoba/levadopa      CONSTITUTIONAL: In no apparent distress.  HEENMT: Airway patent, normal appearing mouth, nose, throat, neck supple with full range of motion  EYES: Pupils equal, round and reactive to light, Extra-ocular movement intact, eyes are clear b/l  CARDIAC: Regular rate and rhythm, Heart sounds S1 S2 present  RESPIRATORY: No respiratory distress. No stridor, Lungs sounds clear with good aeration bilaterally.   GASTROINTESTINAL: Abdomen soft, non-tender and non-distended, no rebound, no guarding and no masses.   MUSCULOSKELETAL: Spine appears normal, movement of extremities grossly intact.  NEUROLOGICAL: Alert and interactive, no focal deficits, tone is normal, moving all extremities well,  SKIN: No cyanosis, no pallor, no jaundice, no rash    IJulio, personally saw the patient with the resident, and completed the key components of the history and physical exam. I then discussed the management plan with the resident.

## 2025-01-22 NOTE — ED ADULT NURSE REASSESSMENT NOTE - NS ED NURSE REASSESS COMMENT FT1
Pt is AOx2-3 NAD noted, pt resting comfortably in bed, side rails up and wheels locked. Pt breathing even and unlabored, pt denies any CP, SOB or HA. Pt is awaiting ambulanz, POC explained, pt verbalized understanding and has no acute complaints at this time.

## 2025-01-22 NOTE — ED PROVIDER NOTE - OBJECTIVE STATEMENT
74 year old male w/PMHx DM, parkinsons presents to the ED after a fall. States he tripped over a rug and fell to the floor. Initially wife called 911 however patient refused medical assistance. This was followed by a second fall over the same rug and brought in to the ED for eval. Currently patient requesting to go home, denies any headache, vision changes, numbness tingling or weakness. Uses a walker at baseline. Missed his parkinson's bedtime meds and requesting a dosage here - on 25/250 carbidoba/levadopa

## 2025-01-22 NOTE — ED ADULT TRIAGE NOTE - CHIEF COMPLAINT QUOTE
patient arrives from home status post fall x 2, ems reports patient fell once and was RMA'd, fell a second time and brought to Emergency Department for eval, patient stated he tripped over a blanket, warm to touch, bradykinesia to right side, Awake and Alert

## 2025-01-22 NOTE — ED PROVIDER NOTE - PHYSICAL EXAMINATION
Gen: NAD, AOx3  Head: NCAT  HEENT: EOMI, oral mucosa moist, normal conjunctiva, neck supple  Lung: CTAB, no respiratory distress  CV: rrr, no murmur, Normal perfusion  Abd: soft, NTND  MSK: No edema, no visible deformities  Neuro: unable to fully assess  Skin: No rash   Psych: normal affect

## 2025-01-22 NOTE — ED PROVIDER NOTE - NSFOLLOWUPINSTRUCTIONS_ED_ALL_ED_FT
You were seen in the emergency room following a fall. CT imaging was performed on your head and neck which were negative for any acute injury. Please follow up with your primary care provider within 1 week.    Return to the emergency room for any of the following: sudden severe headache, chest pain, shortness of breath, confusion, weakness worse on one side than the other, or any new or worsening symptoms.

## 2025-01-22 NOTE — ED ADULT NURSE NOTE - OBJECTIVE STATEMENT
Pt is a 75yo male who presents to the ED after a fall, Pt states the first time he fell over a blanket, as per EMS  pt fell twice but pt is not able to state how he fell the second time. Pt denies any LOC use of AC, or headstrike. Upon assessment, PT is alert and oriented, with a patent and self maintained airway, with non labored breathing. PT denies CP, SOB, HA, N/V/D, Fevers or chills.

## 2025-02-20 ENCOUNTER — NON-APPOINTMENT (OUTPATIENT)
Age: 75
End: 2025-02-20

## 2025-02-26 ENCOUNTER — APPOINTMENT (OUTPATIENT)
Dept: NEUROLOGY | Facility: CLINIC | Age: 75
End: 2025-02-26
Payer: MEDICARE

## 2025-02-26 VITALS
HEIGHT: 73 IN | WEIGHT: 150 LBS | HEART RATE: 90 BPM | DIASTOLIC BLOOD PRESSURE: 73 MMHG | SYSTOLIC BLOOD PRESSURE: 135 MMHG | BODY MASS INDEX: 19.88 KG/M2

## 2025-02-26 PROCEDURE — 99214 OFFICE O/P EST MOD 30 MIN: CPT

## 2025-03-04 ENCOUNTER — APPOINTMENT (OUTPATIENT)
Dept: NEUROSURGERY | Facility: CLINIC | Age: 75
End: 2025-03-04
Payer: MEDICARE

## 2025-03-04 PROCEDURE — 99213 OFFICE O/P EST LOW 20 MIN: CPT

## 2025-03-11 DIAGNOSIS — Z96.89 PRESENCE OF OTHER SPECIFIED FUNCTIONAL IMPLANTS: ICD-10-CM

## 2025-03-19 PROBLEM — Z86.69 PERSONAL HISTORY OF OTHER DISEASES OF THE NERVOUS SYSTEM AND SENSE ORGANS: Chronic | Status: ACTIVE | Noted: 2025-01-22

## 2025-03-27 ENCOUNTER — OUTPATIENT (OUTPATIENT)
Dept: OUTPATIENT SERVICES | Facility: HOSPITAL | Age: 75
LOS: 1 days | End: 2025-03-27
Payer: MEDICARE

## 2025-03-27 VITALS
RESPIRATION RATE: 16 BRPM | DIASTOLIC BLOOD PRESSURE: 76 MMHG | HEART RATE: 67 BPM | WEIGHT: 151.9 LBS | SYSTOLIC BLOOD PRESSURE: 100 MMHG | HEIGHT: 72 IN | OXYGEN SATURATION: 96 % | TEMPERATURE: 99 F

## 2025-03-27 DIAGNOSIS — Z98.890 OTHER SPECIFIED POSTPROCEDURAL STATES: Chronic | ICD-10-CM

## 2025-03-27 DIAGNOSIS — E11.9 TYPE 2 DIABETES MELLITUS WITHOUT COMPLICATIONS: ICD-10-CM

## 2025-03-27 DIAGNOSIS — Z96.89 PRESENCE OF OTHER SPECIFIED FUNCTIONAL IMPLANTS: Chronic | ICD-10-CM

## 2025-03-27 DIAGNOSIS — Z86.79 PERSONAL HISTORY OF OTHER DISEASES OF THE CIRCULATORY SYSTEM: ICD-10-CM

## 2025-03-27 DIAGNOSIS — M47.816 SPONDYLOSIS WITHOUT MYELOPATHY OR RADICULOPATHY, LUMBAR REGION: ICD-10-CM

## 2025-03-27 DIAGNOSIS — Z86.69 PERSONAL HISTORY OF OTHER DISEASES OF THE NERVOUS SYSTEM AND SENSE ORGANS: ICD-10-CM

## 2025-03-27 DIAGNOSIS — Z01.818 ENCOUNTER FOR OTHER PREPROCEDURAL EXAMINATION: ICD-10-CM

## 2025-03-27 LAB
ANION GAP SERPL CALC-SCNC: 17 MMOL/L — SIGNIFICANT CHANGE UP (ref 5–17)
APPEARANCE UR: CLEAR — SIGNIFICANT CHANGE UP
BACTERIA # UR AUTO: NEGATIVE /HPF — SIGNIFICANT CHANGE UP
BILIRUB UR-MCNC: NEGATIVE — SIGNIFICANT CHANGE UP
BUN SERPL-MCNC: 40 MG/DL — HIGH (ref 7–23)
CALCIUM SERPL-MCNC: 9.9 MG/DL — SIGNIFICANT CHANGE UP (ref 8.4–10.5)
CAST: 2 /LPF — SIGNIFICANT CHANGE UP (ref 0–4)
CHLORIDE SERPL-SCNC: 104 MMOL/L — SIGNIFICANT CHANGE UP (ref 96–108)
CO2 SERPL-SCNC: 20 MMOL/L — LOW (ref 22–31)
COLOR SPEC: YELLOW — SIGNIFICANT CHANGE UP
CREAT SERPL-MCNC: 1.24 MG/DL — SIGNIFICANT CHANGE UP (ref 0.5–1.3)
DIFF PNL FLD: NEGATIVE — SIGNIFICANT CHANGE UP
EGFR: 60 ML/MIN/1.73M2 — SIGNIFICANT CHANGE UP
EGFR: 60 ML/MIN/1.73M2 — SIGNIFICANT CHANGE UP
GLUCOSE SERPL-MCNC: 157 MG/DL — HIGH (ref 70–99)
GLUCOSE UR QL: >=1000 MG/DL
HCT VFR BLD CALC: 35.9 % — LOW (ref 39–50)
HGB BLD-MCNC: 11.3 G/DL — LOW (ref 13–17)
INR BLD: 1.02 RATIO — SIGNIFICANT CHANGE UP (ref 0.85–1.16)
KETONES UR-MCNC: ABNORMAL MG/DL
LEUKOCYTE ESTERASE UR-ACNC: NEGATIVE — SIGNIFICANT CHANGE UP
MCHC RBC-ENTMCNC: 28.3 PG — SIGNIFICANT CHANGE UP (ref 27–34)
MCHC RBC-ENTMCNC: 31.5 G/DL — LOW (ref 32–36)
MCV RBC AUTO: 89.8 FL — SIGNIFICANT CHANGE UP (ref 80–100)
NITRITE UR-MCNC: NEGATIVE — SIGNIFICANT CHANGE UP
NRBC BLD AUTO-RTO: 0 /100 WBCS — SIGNIFICANT CHANGE UP (ref 0–0)
PH UR: 5 — SIGNIFICANT CHANGE UP (ref 5–8)
PLATELET # BLD AUTO: 336 K/UL — SIGNIFICANT CHANGE UP (ref 150–400)
POTASSIUM SERPL-MCNC: 4.8 MMOL/L — SIGNIFICANT CHANGE UP (ref 3.5–5.3)
POTASSIUM SERPL-SCNC: 4.8 MMOL/L — SIGNIFICANT CHANGE UP (ref 3.5–5.3)
PROT UR-MCNC: 30 MG/DL
PROTHROM AB SERPL-ACNC: 11.7 SEC — SIGNIFICANT CHANGE UP (ref 9.9–13.4)
RBC # BLD: 4 M/UL — LOW (ref 4.2–5.8)
RBC # FLD: 14.9 % — HIGH (ref 10.3–14.5)
RBC CASTS # UR COMP ASSIST: 0 /HPF — SIGNIFICANT CHANGE UP (ref 0–4)
SODIUM SERPL-SCNC: 141 MMOL/L — SIGNIFICANT CHANGE UP (ref 135–145)
SP GR SPEC: >1.03 — HIGH (ref 1–1.03)
SQUAMOUS # UR AUTO: 2 /HPF — SIGNIFICANT CHANGE UP (ref 0–5)
UROBILINOGEN FLD QL: 1 MG/DL — SIGNIFICANT CHANGE UP (ref 0.2–1)
WBC # BLD: 8.21 K/UL — SIGNIFICANT CHANGE UP (ref 3.8–10.5)
WBC # FLD AUTO: 8.21 K/UL — SIGNIFICANT CHANGE UP (ref 3.8–10.5)
WBC UR QL: 0 /HPF — SIGNIFICANT CHANGE UP (ref 0–5)

## 2025-03-27 PROCEDURE — 80048 BASIC METABOLIC PNL TOTAL CA: CPT

## 2025-03-27 PROCEDURE — G0463: CPT

## 2025-03-27 PROCEDURE — 87641 MR-STAPH DNA AMP PROBE: CPT

## 2025-03-27 PROCEDURE — 85610 PROTHROMBIN TIME: CPT

## 2025-03-27 PROCEDURE — 85027 COMPLETE CBC AUTOMATED: CPT

## 2025-03-27 PROCEDURE — 83036 HEMOGLOBIN GLYCOSYLATED A1C: CPT

## 2025-03-27 PROCEDURE — 81001 URINALYSIS AUTO W/SCOPE: CPT

## 2025-03-27 PROCEDURE — 87086 URINE CULTURE/COLONY COUNT: CPT

## 2025-03-27 PROCEDURE — 87640 STAPH A DNA AMP PROBE: CPT

## 2025-03-27 RX ORDER — ROSUVASTATIN CALCIUM 5 MG/1
1 TABLET, FILM COATED ORAL
Refills: 0 | DISCHARGE

## 2025-03-27 RX ORDER — ALPRAZOLAM 0.5 MG
1 TABLET, EXTENDED RELEASE 24 HR ORAL
Refills: 0 | DISCHARGE

## 2025-03-27 RX ORDER — TAMSULOSIN HYDROCHLORIDE 0.4 MG/1
1 CAPSULE ORAL
Refills: 0 | DISCHARGE

## 2025-03-27 RX ORDER — FINASTERIDE 1 MG/1
1 TABLET, FILM COATED ORAL
Refills: 0 | DISCHARGE

## 2025-03-27 RX ORDER — CARBIDOPA/LEVODOPA 25MG-100MG
1 TABLET ORAL
Refills: 0 | DISCHARGE

## 2025-03-27 RX ORDER — LOSARTAN POTASSIUM 100 MG/1
1 TABLET, FILM COATED ORAL
Refills: 0 | DISCHARGE

## 2025-03-27 RX ORDER — MIRTAZAPINE 30 MG/1
1 TABLET, FILM COATED ORAL
Refills: 0 | DISCHARGE

## 2025-03-27 RX ORDER — DAPAGLIFLOZIN AND METFORMIN HYDROCHLORIDE 2.5; 1 MG/1; MG/1
1 TABLET, FILM COATED, EXTENDED RELEASE ORAL
Refills: 0 | DISCHARGE

## 2025-03-27 RX ORDER — ASPIRIN 325 MG
1 TABLET ORAL
Refills: 0 | DISCHARGE

## 2025-03-27 RX ORDER — OXYCODONE HYDROCHLORIDE 30 MG/1
1 TABLET ORAL
Refills: 0 | DISCHARGE

## 2025-03-27 NOTE — H&P PST ADULT - ASSESSMENT
DASI Score: 5.72  DASI Activity: able to go up one flight of stairs or walk 1-2 blocks with out difficulty  Loose or removable teeth: denies  DASI Score: 5.72  DASI Activity: pt ambulate in house with walker, able to shower and dress self.    Loose or removable teeth: denies

## 2025-03-27 NOTE — H&P PST ADULT - HISTORY OF PRESENT ILLNESS
74 year old right handed man who was diagnosed with Parkinson's disease in 2014, when he started with difficulty writing, difficulty with gait, and decreased right arm swing.  ?  Hospitalized last September for kidney failure, on dialysis for a month, then rehab, now home. Kidneys recovered.  ?  1. Saw Dr Shu, who recommended CAPSIT and formal evaluation. Had CAPSIT 24% improvement.  2. Back pain continues to be the big issues, worse when standing. Has a spinal cord stimulator, due to be removed, has not happened yet. Used to see pain management, now on duloxetine 60 mg daily, and gabapentin 300 mg tid. Seeing pain management, awaiting transfer of records. Recent MRI showed spinal stenosis (report not in system)  3. Taking Sinemet 25/250 6x/day now (spread evenly between getting up and bedtime). This improved pain, walking. Each. ADLs independent. Never started ongentys. Also on midodrine 5 mg tid now.  4. Artane and selegiline were stopped  5. Tries to exercise once a week. Exercises in the house. No speech difficulty. Stopped working. PT/OT come to house. Sleeping better with Xanax and he uses it when he needs it but has not had a prescription in a while. On Mirtazapine 15 mg.  6. Not acting out of his dreams but does have vivid dreaming. Mood is depressed.  ? 74 year old male with past medical hx of  parkinson's (dx in 2014), hypotension. Pshx loop recorder, spinal cord stimulator implant.  Patient experiences motor fluctuations including levodopa-sensitive off symptoms in the last 2 years. Medication kicks in after 20 minutes and begins to wear off after 3 hours. Patient reports dyskinesia as his dose wears off. He feels slower, gait is worse, and voice is lower in the off state. Dyskinesia improves after dose kicks in.  Pt states stimulator does not help and would like to remove stimulator prior to possible DPS procedure with Dr. Suh. Denies any chest pain, palpitations, SOB, N/V, fever or chills. He now presents to PST prior to scheduled Removal of Spinal Cord Stimulator Paddle and Battery with Dr. Suh on 4/14/25.       Of note: Pt  was hospitalized last September for kidney failure, on dialysis for a month, then rehab, now home. Kidneys recovered. Pt also states saw cardiology for medical evaluation prior to procedure and further work up is required prior to approval.  PT he believes it because he has "fluid or something around the heart"  ?      ? 74 year old male with past medical hx of  parkinson's (dx in 2014), Dyskinesia, hypotension on midodrine T2DM, BPH. Pshx loop recorder, spinal cord stimulator implant. C/o motor fluctuations including levodopa-sensitive off symptoms in the last 2 years. Medication kicks in after 20 minutes and begins to wear off after 3 hours. Patient reports dyskinesia as his dose wears off. He feels slower, gait is worse, and voice is lower in the off state. Dyskinesia improves after dose kicks in.  Pt states stimulator does not help and would like to remove stimulator prior to possible DPS procedure with Dr. Suh. Denies any chest pain, palpitations, SOB, N/V, fever or chills. He now presents to PST prior to scheduled Removal of Spinal Cord Stimulator Paddle and Battery with Dr. Suh on 4/14/25.     Of note: Pt  was hospitalized last September for kidney failure, on dialysis for a month, then rehab discharge to home. Kidneys recovered. Pt also states saw cardiology for medical evaluation prior to procedure and further work up is required prior to approval. Patient he believes it because he has "fluid or something around the heart".   ?      ? 74 year old male with past medical hx of  parkinson's (dx in 2014), Dyskinesia, hypotension on midodrine T2DM, BPH. Pshx loop recorder, spinal cord stimulator implant. C/o motor fluctuations including levodopa-sensitive off symptoms in the last 2 years. Medication kicks in after 20 minutes and begins to wear off after 3 hours. Patient reports dyskinesia as his dose wears off. He feels slower, gait is worse, and voice is lower in the off state. Dyskinesia improves after dose kicks in.  Pt states stimulator does not help and would like to remove stimulator prior to possible DPS procedure with Dr. Suh. Denies any chest pain, palpitations, SOB, N/V, fever or chills. He now presents to PST prior to scheduled Removal of Spinal Cord Stimulator Paddle and Battery with Dr. Suh on 4/14/25.     Of note: Pt  was hospitalized last September for kidney failure, on dialysis for a month, then rehab discharge to home. Kidneys recovered. Pt also states saw cardiology for medical evaluation prior to procedure and further work up is required prior to approval. Patient he believes it because he has "fluid or something around the heart".   ?  04/08/25 Addendum: cardiac work-up revealed positive stress test. Pt. underwent cardiac cath on 04/03/25, which revealed severe CAD. Pt. had stents x 3 placed. As per Dr. MAGDALENO Jimenez recommendation: "Must defer any consideration for spine or brain surgery at this time".  Surgeon notified.  N.M.      ?

## 2025-03-27 NOTE — H&P PST ADULT - NEGATIVE ENMT SYMPTOMS
Caller would like to discuss an/a Return call. Writer advised caller of callback within 24-72 hours.    Patient Name: Sofiya Pitts  Caller Name: Sofiya  Callback Number: [phone]  Best Availability: anytime  Can A Detailed Message Be left? yes  Additional Info: Patient calling and would like to discuss a return call for previous messages.  Patient states she would like to know her updated results ad does not believe an extra appointment should be needed.  Patient does request a return call at [phone].  Did you confirm the message with the caller?: yes    Thank you,  Catrachita Cabrera     no hearing difficulty/no ear pain/no tinnitus/no vertigo

## 2025-03-27 NOTE — H&P PST ADULT - MUSCULOSKELETAL
details… normal/ROM intact/normal gait/strength 5/5 bilateral upper extremities/strength 5/5 bilateral lower extremities strength 5/5 bilateral upper extremities/strength 5/5 bilateral lower extremities/abnormal gait

## 2025-03-27 NOTE — H&P PST ADULT - PROBLEM SELECTOR PLAN 1
Pt. scheduled for Removal of Spinal Cord Stimulator Paddle and Battery with Dr. Suh on 4/14/25.   Pre-op instructions given, all questions answered.  Surgical Soap given.  Labs: CBC, BMP, A1C, PT/INR. MRSA, UA, UC    last dose of ASA 81 on 4/7/25  Continue Carbado/Lev DOS

## 2025-03-27 NOTE — H&P PST ADULT - NSANTHOSAYNRD_GEN_A_CORE
No. CHRISTOFER screening performed.  STOP BANG Legend: 0-2 = LOW Risk; 3-4 = INTERMEDIATE Risk; 5-8 = HIGH Risk

## 2025-03-27 NOTE — H&P PST ADULT - NSICDXPASTMEDICALHX_GEN_ALL_CORE_FT
PAST MEDICAL HISTORY:  H/O Parkinson's disease      PAST MEDICAL HISTORY:  DM (diabetes mellitus)     Dyskinesia     H/O Parkinson's disease     History of BPH     History of hypotension     HLD (hyperlipidemia)

## 2025-03-27 NOTE — H&P PST ADULT - ALCOHOL USE HISTORY SINGLE SELECT
December 28, 2020     Patient: Leonor Fleming   YOB: 1966   Date of Visit: 12/28/2020       To Whom it May Concern:    Leonor Fleming was seen in my clinic on 12/28/2020 at 8:30 am.    Patient can return to work on 1/6/2021    Sincerely,         Mary Saleh MD    Medical information is confidential and cannot be disclosed without the written consent of the patient or her representative.      
never

## 2025-03-28 PROBLEM — Z86.69 PERSONAL HISTORY OF OTHER DISEASES OF THE NERVOUS SYSTEM AND SENSE ORGANS: Chronic | Status: INACTIVE | Noted: 2025-01-22 | Resolved: 2025-03-27

## 2025-03-28 LAB
A1C WITH ESTIMATED AVERAGE GLUCOSE RESULT: 7.4 % — HIGH (ref 4–5.6)
CULTURE RESULTS: SIGNIFICANT CHANGE UP
ESTIMATED AVERAGE GLUCOSE: 166 MG/DL — HIGH (ref 68–114)
MRSA PCR RESULT.: SIGNIFICANT CHANGE UP
S AUREUS DNA NOSE QL NAA+PROBE: SIGNIFICANT CHANGE UP
SPECIMEN SOURCE: SIGNIFICANT CHANGE UP

## 2025-04-14 ENCOUNTER — APPOINTMENT (OUTPATIENT)
Dept: NEUROSURGERY | Facility: HOSPITAL | Age: 75
End: 2025-04-14

## 2025-04-26 PROBLEM — G24.9 DYSTONIA, UNSPECIFIED: Chronic | Status: ACTIVE | Noted: 2025-03-27

## 2025-04-26 PROBLEM — Z86.69 PERSONAL HISTORY OF OTHER DISEASES OF THE NERVOUS SYSTEM AND SENSE ORGANS: Chronic | Status: ACTIVE | Noted: 2025-03-27

## 2025-04-26 PROBLEM — Z87.438 PERSONAL HISTORY OF OTHER DISEASES OF MALE GENITAL ORGANS: Chronic | Status: ACTIVE | Noted: 2025-03-27

## 2025-04-26 PROBLEM — Z86.79 PERSONAL HISTORY OF OTHER DISEASES OF THE CIRCULATORY SYSTEM: Chronic | Status: ACTIVE | Noted: 2025-03-27

## 2025-04-26 PROBLEM — E78.5 HYPERLIPIDEMIA, UNSPECIFIED: Chronic | Status: ACTIVE | Noted: 2025-03-27

## 2025-04-26 PROBLEM — E11.9 TYPE 2 DIABETES MELLITUS WITHOUT COMPLICATIONS: Chronic | Status: ACTIVE | Noted: 2025-03-27

## 2025-05-01 ENCOUNTER — APPOINTMENT (OUTPATIENT)
Dept: NEUROSURGERY | Facility: CLINIC | Age: 75
End: 2025-05-01

## 2025-06-04 ENCOUNTER — INPATIENT (INPATIENT)
Facility: HOSPITAL | Age: 75
LOS: 1 days | Discharge: HOME | DRG: 684 | End: 2025-06-06
Attending: STUDENT IN AN ORGANIZED HEALTH CARE EDUCATION/TRAINING PROGRAM | Admitting: HOSPITALIST
Payer: MEDICARE

## 2025-06-04 VITALS
SYSTOLIC BLOOD PRESSURE: 127 MMHG | OXYGEN SATURATION: 97 % | TEMPERATURE: 99 F | RESPIRATION RATE: 18 BRPM | DIASTOLIC BLOOD PRESSURE: 60 MMHG | HEART RATE: 72 BPM

## 2025-06-04 DIAGNOSIS — Z96.89 PRESENCE OF OTHER SPECIFIED FUNCTIONAL IMPLANTS: Chronic | ICD-10-CM

## 2025-06-04 DIAGNOSIS — Z98.61 CORONARY ANGIOPLASTY STATUS: Chronic | ICD-10-CM

## 2025-06-04 DIAGNOSIS — Z98.890 OTHER SPECIFIED POSTPROCEDURAL STATES: Chronic | ICD-10-CM

## 2025-06-04 LAB
ALBUMIN SERPL ELPH-MCNC: 4.1 G/DL — SIGNIFICANT CHANGE UP (ref 3.3–5.2)
ALP SERPL-CCNC: 88 U/L — SIGNIFICANT CHANGE UP (ref 40–120)
ALT FLD-CCNC: <5 U/L — SIGNIFICANT CHANGE UP
ANION GAP SERPL CALC-SCNC: 17 MMOL/L — SIGNIFICANT CHANGE UP (ref 5–17)
ANION GAP SERPL CALC-SCNC: 22 MMOL/L — HIGH (ref 5–17)
APTT BLD: 32.3 SEC — SIGNIFICANT CHANGE UP (ref 26.1–36.8)
AST SERPL-CCNC: 28 U/L — SIGNIFICANT CHANGE UP
BILIRUB SERPL-MCNC: 0.3 MG/DL — LOW (ref 0.4–2)
BUN SERPL-MCNC: 52.2 MG/DL — HIGH (ref 8–20)
BUN SERPL-MCNC: 55.4 MG/DL — HIGH (ref 8–20)
CALCIUM SERPL-MCNC: 9.3 MG/DL — SIGNIFICANT CHANGE UP (ref 8.4–10.5)
CALCIUM SERPL-MCNC: 9.4 MG/DL — SIGNIFICANT CHANGE UP (ref 8.4–10.5)
CHLORIDE SERPL-SCNC: 103 MMOL/L — SIGNIFICANT CHANGE UP (ref 96–108)
CHLORIDE SERPL-SCNC: 105 MMOL/L — SIGNIFICANT CHANGE UP (ref 96–108)
CO2 SERPL-SCNC: 15 MMOL/L — LOW (ref 22–29)
CO2 SERPL-SCNC: 19 MMOL/L — LOW (ref 22–29)
CREAT SERPL-MCNC: 2.42 MG/DL — HIGH (ref 0.5–1.3)
CREAT SERPL-MCNC: 2.54 MG/DL — HIGH (ref 0.5–1.3)
EGFR: 25 ML/MIN/1.73M2 — LOW
EGFR: 25 ML/MIN/1.73M2 — LOW
EGFR: 27 ML/MIN/1.73M2 — LOW
EGFR: 27 ML/MIN/1.73M2 — LOW
GLUCOSE SERPL-MCNC: 102 MG/DL — HIGH (ref 70–99)
GLUCOSE SERPL-MCNC: 59 MG/DL — LOW (ref 70–99)
HCT VFR BLD CALC: 32.7 % — LOW (ref 39–50)
HGB BLD-MCNC: 10.1 G/DL — LOW (ref 13–17)
INR BLD: 1.07 RATIO — SIGNIFICANT CHANGE UP (ref 0.85–1.16)
MCHC RBC-ENTMCNC: 29.2 PG — SIGNIFICANT CHANGE UP (ref 27–34)
MCHC RBC-ENTMCNC: 30.9 G/DL — LOW (ref 32–36)
MCV RBC AUTO: 94.5 FL — SIGNIFICANT CHANGE UP (ref 80–100)
NRBC # BLD AUTO: 0.02 K/UL — HIGH (ref 0–0)
NRBC # FLD: 0.02 K/UL — HIGH (ref 0–0)
NRBC BLD AUTO-RTO: 0 /100 WBCS — SIGNIFICANT CHANGE UP (ref 0–0)
PLATELET # BLD AUTO: 322 K/UL — SIGNIFICANT CHANGE UP (ref 150–400)
PMV BLD: 9.3 FL — SIGNIFICANT CHANGE UP (ref 7–13)
POTASSIUM SERPL-MCNC: 5.4 MMOL/L — HIGH (ref 3.5–5.3)
POTASSIUM SERPL-MCNC: 6.8 MMOL/L — CRITICAL HIGH (ref 3.5–5.3)
POTASSIUM SERPL-SCNC: 5.4 MMOL/L — HIGH (ref 3.5–5.3)
POTASSIUM SERPL-SCNC: 6.8 MMOL/L — CRITICAL HIGH (ref 3.5–5.3)
PROT SERPL-MCNC: 7.2 G/DL — SIGNIFICANT CHANGE UP (ref 6.6–8.7)
PROTHROM AB SERPL-ACNC: 12.1 SEC — SIGNIFICANT CHANGE UP (ref 9.9–13.4)
RBC # BLD: 3.46 M/UL — LOW (ref 4.2–5.8)
RBC # FLD: 14.8 % — HIGH (ref 10.3–14.5)
SODIUM SERPL-SCNC: 140 MMOL/L — SIGNIFICANT CHANGE UP (ref 135–145)
SODIUM SERPL-SCNC: 140 MMOL/L — SIGNIFICANT CHANGE UP (ref 135–145)
TROPONIN T, HIGH SENSITIVITY RESULT: 42 NG/L — SIGNIFICANT CHANGE UP (ref 0–51)
WBC # BLD: 12.85 K/UL — HIGH (ref 3.8–10.5)
WBC # FLD AUTO: 12.85 K/UL — HIGH (ref 3.8–10.5)

## 2025-06-04 PROCEDURE — 93010 ELECTROCARDIOGRAM REPORT: CPT

## 2025-06-04 PROCEDURE — 74176 CT ABD & PELVIS W/O CONTRAST: CPT | Mod: 26

## 2025-06-04 PROCEDURE — 71045 X-RAY EXAM CHEST 1 VIEW: CPT | Mod: 26

## 2025-06-04 PROCEDURE — 71250 CT THORAX DX C-: CPT | Mod: 26

## 2025-06-04 PROCEDURE — 70450 CT HEAD/BRAIN W/O DYE: CPT | Mod: 26

## 2025-06-04 PROCEDURE — 72125 CT NECK SPINE W/O DYE: CPT | Mod: 26

## 2025-06-04 PROCEDURE — 73552 X-RAY EXAM OF FEMUR 2/>: CPT | Mod: 26,LT

## 2025-06-04 PROCEDURE — 72131 CT LUMBAR SPINE W/O DYE: CPT | Mod: 26

## 2025-06-04 PROCEDURE — 99285 EMERGENCY DEPT VISIT HI MDM: CPT

## 2025-06-04 PROCEDURE — 73502 X-RAY EXAM HIP UNI 2-3 VIEWS: CPT | Mod: 26,LT

## 2025-06-04 RX ORDER — LORAZEPAM 4 MG/ML
0.5 VIAL (ML) INJECTION ONCE
Refills: 0 | Status: DISCONTINUED | OUTPATIENT
Start: 2025-06-04 | End: 2025-06-04

## 2025-06-04 RX ORDER — SODIUM BICARBONATE 1 MEQ/ML
50 SYRINGE (ML) INTRAVENOUS ONCE
Refills: 0 | Status: COMPLETED | OUTPATIENT
Start: 2025-06-04 | End: 2025-06-04

## 2025-06-04 RX ORDER — CALCIUM GLUCONATE 20 MG/ML
2 INJECTION, SOLUTION INTRAVENOUS ONCE
Refills: 0 | Status: COMPLETED | OUTPATIENT
Start: 2025-06-04 | End: 2025-06-04

## 2025-06-04 RX ORDER — SODIUM ZIRCONIUM CYCLOSILICATE 5 G/5G
10 POWDER, FOR SUSPENSION ORAL ONCE
Refills: 0 | Status: COMPLETED | OUTPATIENT
Start: 2025-06-04 | End: 2025-06-04

## 2025-06-04 RX ORDER — CARBIDOPA/LEVODOPA 25MG-100MG
1 TABLET ORAL
Refills: 0 | Status: DISCONTINUED | OUTPATIENT
Start: 2025-06-04 | End: 2025-06-05

## 2025-06-04 RX ORDER — CARBIDOPA/LEVODOPA 25MG-100MG
1 TABLET ORAL ONCE
Refills: 0 | Status: COMPLETED | OUTPATIENT
Start: 2025-06-04 | End: 2025-06-04

## 2025-06-04 RX ORDER — DEXTROSE 50 % IN WATER 50 %
50 SYRINGE (ML) INTRAVENOUS ONCE
Refills: 0 | Status: COMPLETED | OUTPATIENT
Start: 2025-06-04 | End: 2025-06-04

## 2025-06-04 RX ADMIN — CALCIUM GLUCONATE 200 GRAM(S): 20 INJECTION, SOLUTION INTRAVENOUS at 18:30

## 2025-06-04 RX ADMIN — Medication 2 MILLIGRAM(S): at 19:33

## 2025-06-04 RX ADMIN — SODIUM ZIRCONIUM CYCLOSILICATE 10 GRAM(S): 5 POWDER, FOR SUSPENSION ORAL at 18:30

## 2025-06-04 RX ADMIN — Medication 1000 MILLILITER(S): at 23:58

## 2025-06-04 RX ADMIN — Medication 1 TABLET(S): at 20:07

## 2025-06-04 RX ADMIN — Medication 50 MILLILITER(S): at 20:06

## 2025-06-04 RX ADMIN — Medication 0.5 MILLIGRAM(S): at 20:44

## 2025-06-04 RX ADMIN — Medication 2 MILLIGRAM(S): at 16:29

## 2025-06-04 RX ADMIN — Medication 1 TABLET(S): at 22:44

## 2025-06-04 RX ADMIN — Medication 50 MILLIEQUIVALENT(S): at 18:30

## 2025-06-04 RX ADMIN — Medication 1 TABLET(S): at 17:22

## 2025-06-04 RX ADMIN — Medication 4 UNIT(S): at 20:10

## 2025-06-04 NOTE — H&P ADULT - NSHPPHYSICALEXAM_GEN_ALL_CORE
GENERAL: pt examined bedside, laying comfortably in bed in NAD  HEENT: NC/AT, moist oral mucosa, clear conjunctiva, sclera nonicteric  RESPIRATORY: Normal respiratory effort; CTA b/l, no wheezing, rhonchi, rales  CARDIOVASCULAR: RRR, normal S1 and S2, no murmur/rub/gallop  ABDOMEN: soft, NT/ND, normoactive bowel sounds, no rebound/guarding  MSK: No joint deformities, edema, erythema  EXTREMITIES: No cynaosis, no clubbing, no lower extremity edema; Peripheral pulses are 2+ bilaterally  PSYCH: affect appropriate and cooperative  NEUROLOGY: A+O to person, place, and time, no focal neurologic deficits appreciated  SKIN: No rashes or no palpable lesions GENERAL: pt examined bedside, laying comfortably in bed in NAD  HEENT: NC/AT, dry oral mucosa, clear conjunctiva, sclera nonicteric  RESPIRATORY: Normal respiratory effort, no wheezing, rhonchi, rales  CARDIOVASCULAR: RRR, normal S1 and S2, no murmur/rub/gallop  ABDOMEN: soft, NT/ND, +bowel sounds, no rebound/guarding  MSK: No joint deformities, edema, erythema  EXTREMITIES: No cynaosis, no clubbing, no lower extremity edema  PSYCH: affect flat but cooperative  NEUROLOGY: A+O to person, place, and time, resting upper and lower extremity tremors, strength 5/5, sensation intact   SKIN: poor turgor

## 2025-06-04 NOTE — ED ADULT NURSE NOTE - NSFALLRISKINTERV_ED_ALL_ED

## 2025-06-04 NOTE — H&P ADULT - ASSESSMENT
73 y/o M w/ PMH of Parkinson's dx, dyskinesia, orthostatic hypotension (on midodrine), DM type II, BPH. ?prostate CA, s/p spinal cord stimulator implant, CAD s/p PCI w/ [facility]INA x3 4/3/25 w/ Dr. MAGDALENO Jimenez (on ASA and brilinta), CKD s/p renal failure 9/2024 requiring course of HD w/ renal improvement and now off HD, ambulates w/ walker at baseline, presented from home after a mechanical fall while walking in his bedroom yesterday and AMS.  Pt at this time is AAOx3 and able to remember event clearly but is unsure how long he was down for.  VS WNL.  Clinically hypovolemic but mentation near baseline.   Initial w/u significant for WBC 12K, K6.8-->5.4, HCO3 15-->19, AG22-->17, BUN52/2.54-->55/2.42.  CTH and Cspine w/ no acute findings.  CT c/a/p w/ no tramatic injury within chest, abd or pelvis, no kidney stones or hydronephrosis.  s/p 1L NS bolus, 4u insulin/dextrose cocktail, 1amp NaHCO3, 10g lokelma, 2g calcium gluc, 0.5mg IV ativan, 2mg IV morphine x2 in ED.  Will admit for acute renal failure.        Fall likely related to parkinson's w/ autonomic dysregulation    - Pt reports it was a mechanical fall and had no near syncopal prodrome prior to fall   - CTH and Cspine w/ no acute findings   - CT c/a/p w/ no traumatic injury within chest, abd or pelvis  - Will check orthostats   - c/w midodrine and carbidopa/levodopa  - c/w mirtazapine   - Fall and aspiration precautions   - PT consulted       Acute metabolic encephalopathy likely 2/2 uremia/acute renal failure(ARF)  Acute renal failure on CKD, possibly multifactorial from dehydration and mild rhabdo, r/o metformin induced ARF  Hyperkalemia likely 2/2 ARF and on ARB  - Mentation near baseline at this time   - Last known Cr 1.24 on 3/27/25 but on initial w/u was 2.54  - Pt has hx of acute renal failure requiring HD 09/2024 w/ improved renal function and now off HD   - K 6.8, HCO3 15, BUN 52 and AG 22 on initial w/u   - Repeat BMP improved K, bicarb and AG s/p 4u insulin/dextrose 1amp NaHCO3, 10g lokelma, 2g calcium gluc and 1L NS bolus  - BUN however w/ slight worsening on repeat BMP   - CT c/a/p: kidneys without stones or hydronephrosis, prostate enlarged and protrudes into post wall of urinary bladder   - Is on metformin therefore will check lactate level   - Repeat stat BMP to reassess AG, lytes, BUN and renal function    - Will also check UA w/ microscopy, protein/Cr ratio, osms, acetone and uric acid   - Mildly elevated CPK but will repeat to ensure not worsening   - Clinically pt is hypovolemic on exam and will c/w IVNS for now and if CPK down trending will switch to LR  - Hold ARB given hyperkalemia and ARF   - Monitor strict I/O's, renal function and lytes   - Bladder scan q6h for now to ensure adequate output  - Avoid nephrotoxic meds or renally dose if needed   - Nephrology consulted ([facility])      Chronic normocytic anemia  - Slight down trend in H/H from 11.3-->10.1 since 03/2025  - Would expect h/h to be higher from hemoconcentration given clinically appears dehydrated   - Is on DAPT and will place on PPI while inpt given also placing on heparin sq but only BID for VTE ppx   - Hemolysis considered given hyperkalemia but Tbili normal  - Will check iron panel and smear for schistocytes   - Monitor CBC and transfuse for Hb<8       CAD s/p PCI 4/3/25  HTN / HLD  - c/w uninterrupted ASA and brilinta given recent PCI  - Hold ARB given current renal function   - c/w BB and statin       DM type II  - f/u A1c  - Hold farxiga and metformin   - ISS and hypoglycemic protocol in place       BPH w/ ?prostate CA  - c/w tamsulosin and finasteride   - Caution w/ bph meds as can precipitate orthostasis   - CT shows Radiation seeds in the prostate gland which would be used for prostate CA      Chronic pain / Dyskinesias  - Confirmed Xanax 0.25mg qhs and oxycodone 5mg BID prn on ISTOP (reference #: [id number])  - c/w cyclobenzaprine and gabapentin      Incidental CT findings   - Heterogeneous thyroid gland with enlarged left thyroid lobe and suspected underlying 3.5 cm nodule  - Nonemergent outpt US recommended to further evaluate        VTE ppx: heparin sq BID given pt is on DAPT    Dispo: Acute.  Anticipate d/c home vs [facility] in 3-5 days pending improved kidney function.  PT consulted.

## 2025-06-04 NOTE — ED PROVIDER NOTE - OBJECTIVE STATEMENT
Mr. Bynum is a 74-year-old male with past medical history of Parkinson's disease (diagnosed 2014), dyskinesia, type 2 diabetes mellitus, BPH, hypotension on midodrine, hyperlipidemia, and recent severe CAD requiring 3 stents on 4/3/25. Patient presents to the ED after a fall at home. History obtained from patient and aide (over the phone) reveals patient in severe pain. Patient states last night he was walking back to his bedroom around 3 AM with his walker when his foot caught something, possibly due to his neurostimulator malfunctioning, causing him to fall backward onto his left hip. A door then swung and struck his head, though he states the headstrike was not severe. He was found by his wife and was able to ambulate to his bedroom. This morning when his aide visited, she noted he was slouching toward his left side and appeared to be in severe pain. She administered his Flexeril, after which he became delirious, prompting her to call EMS. In the ED, patient endorses severe pain in his left hip and left-sided back, denies feeling dizzy prior to the fall, denies chest pain, palpitations, shortness of breath or abdominal pain. Physical exam is notable for severe motor symptoms and coarse tremors.

## 2025-06-04 NOTE — ED PROVIDER NOTE - ATTENDING CONTRIBUTION TO CARE
74-year-old male; PMH significant for Parkinson's, DM 2, BPH, hypotension (on midodrine), HLD, CAD (s/p 3 stents); now presenting s/p fall.  Patient states he tripped and fell backward.  Complaining of back pain.  Positive head trauma.  Denies LOC.  Denies nausea or vomiting.  Denies numbness or tingling.  Patient complaining of left lateral back pain.  Also complaining of left hip pain.  Gen: Alert, NAD  Head: NC, AT, PERRL, EOMI, normal lids/conjunctiva  Neck: +supple, no tenderness/meningismus/JVD, +Trachea midline  Pulm: Bilateral BS, normal resp effort, no wheeze/stridor/retractions  CV: RRR, no M/R/G, +dist pulses  CHEST WALL: nt over chest wall.   Abd: soft, NT/ND, +BS, no hepatosplenomegaly  Mskel:    L UE: from/nt @ shoulder / elbow / wrist / hand   R UE: from/nt @ shoulder / elbow / wrist / hand   L LE: from/nt @ hip / knee / ankle   R LE: from/nt @ hip /knee / ankle   distal pulses intact  BACK: nt midline c/t. ttp @ L2/3. ttp over left flank over neurostimulator  Skin: no rash  Neuro: AAOx3, no sensory/motor deficit  A/P: 74yoM ; PMH significant for Parkinson's, DM 2, BPH, hypotension (on midodrine), HLD, CAD (s/p 3 stents); now presenting s/p fall. c/o back pain  -trauma scans, labs, ekg, pain control, re-eval.

## 2025-06-04 NOTE — ED PROVIDER NOTE - CLINICAL SUMMARY MEDICAL DECISION MAKING FREE TEXT BOX
74y M w/ hx Parkinson's, CKD, DM, HTN, CAD; presents after fall at home. No acute traumatic injuries on workup. Labs notable for hyperkalemia and VALARIE. Hyperkalemia improved with treatment. Admitted to medicine.

## 2025-06-04 NOTE — ED PROVIDER NOTE - PHYSICAL EXAMINATION
GENERAL: elderly male, appears to be in significant distress  HEAD: NC/AT  EYES: PERRLA, EOMI, Non-icteric  NEURO: No focal deficits, A&Ox3, dysarthric speech, severe dyskinesia of all extremities  RESP: CTAB, no wrr, non-labored breathing  CVS: RRR, no murmur appreciated  GI: Soft, non-tender, non-distended, no organomegaly, no appreciable masses, +bs all 4 quadrants  EXTREMITIES: Nontender, no clubbing, cyanosis, or edema, +left hip tenderness  SKIN: No rashes or lesions

## 2025-06-04 NOTE — ED ADULT NURSE REASSESSMENT NOTE - NS ED NURSE REASSESS COMMENT FT1
Assuming care from RN, plan of care, reason for hospital visit were reviewed, introduced to patient, updated patient on plan of care. Education deemed successful after teach back shows proficiency, VS recorded in the EMR. pt denies any complaints at this time. NAD

## 2025-06-04 NOTE — H&P ADULT - NSHPLABSRESULTS_GEN_ALL_CORE
10.1   12.85 )-----------( 322      ( 04 Jun 2025 16:22 )             32.7         06-04    140  |  105  |  55.4[H]  ----------------------------<  102[H]  5.4[H]   |  19.0[L]  |  2.42[H]    Ca    9.3      04 Jun 2025 22:53    TPro  7.2  /  Alb  4.1  /  TBili  0.3[L]  /  DBili  x   /  AST  28  /  ALT  <5  /  AlkPhos  88  06-04        < from: CT Lumbar Spine Reform No Cont (06.04.25 @ 21:12) >    IMPRESSION:    1.  No acute fractures identified.  --- End of Report ---        < from: CT Abdomen and Pelvis No Cont (06.04.25 @ 21:05) >    KIDNEYS/URETERS: No renal stones or hydronephrosis.    BLADDER: Within normal limits.  REPRODUCTIVE ORGANS: Prostate is enlarged and protrudes into the   posterior wall of the urinary bladder. Radiation seeds in the prostate   gland.      IMPRESSION:  No acute traumatic injury within the chest, abdomen or pelvis.    Heterogeneous thyroid gland with enlarged left thyroid lobe and suspected   underlying 3.5 cm nodule. Nonemergent ultrasound recommended to further   evaluate.        < from: CT Chest No Cont (06.04.25 @ 21:04) >    CHEST:  LUNGS AND LARGE AIRWAYS: Patent central airways. No pulmonary nodules or   parenchymal consolidation. Bilateral lower lobe atelectasis.  PLEURA: No pleural effusion.    < end of copied text >    < from: CT Head No Cont (06.04.25 @ 21:02) >      IMPRESSION:    CT HEAD:  No acute intracranial hemorrhage, mass effect, or midline shift.    CT CERVICAL SPINE:  No acute fracture or traumatic subluxation.    Multi-level degenerative changes.    < end of copied text >

## 2025-06-04 NOTE — ED ADULT NURSE NOTE - OBJECTIVE STATEMENT
Patient A and O x 3. came to ED post fall. Reports his back hurt. Has implanted electrical stimulator and thinks he hit this part when he fell. On ASA. Has history of Parkinson's Disease. Able to move all extremities. On room air. Has significant tremors to extremities.

## 2025-06-04 NOTE — ED PROVIDER NOTE - NSICDXPASTSURGICALHX_GEN_ALL_CORE_FT
PAST SURGICAL HISTORY:  History of loop recorder     History of percutaneous coronary intervention     S/P insertion of spinal cord stimulator

## 2025-06-04 NOTE — ED CLERICAL - NS ED CLERK UNITS
--------------  ADMISSION REVIEW     Payor: Yuri Baron #:  [id number]  Authorization Number: [id number]    ADMIT TO INPT 3/13/21    ADMIT TO OBSERVATION ON 3/10/21      3/9: Patient Seen in: [facility] 3ne-a Patient with mild right upper quadrant and midepigastric tenderness. Negative Anna sign, no rebound no guarding  no hepatosplenomegaly bowel sounds are present , no pulsatile mass  Extremities: No clubbing cyanosis or edema 2+ distal pulses.   Neuro: Cra TELE Disposition and Plan   Clinical Impression:  Abdominal pain, right upper quadrant  (primary encounter diagnosis)  Elevated LFTs  Biliary colic  Cholelithiasis       3/10:    [facility]  History and Physical     History of Present Illness:  Dolly Partida duodenum     Procedure performed:  Laparoscopic cholecystectomy with intraoperative cholangiogram    Summary of Procedure: The patient was taken to the operating room and was placed on the OR table in the supine position.  After the induction of general a was free flow of contrast into the duodenum. The proximal common hepatic duct and distal intrahepatic radicals were visualized  without any evidence of a filling defects or other abnormalities.  Representative images were submitted to the Radiology Departme liquids today.    O: /69 (BP Location: Left arm)   Pulse 68   Temp 98 °F (36.7 °C) (Oral)   Resp 16   Ht 5' 3\" (1.6 m)   Wt 173 lb (78.5 kg)   LMP 02/28/2021   SpO2 100%   BMI 30.65 kg/m²   Assessment: 30 yr-old female admitted with biliary colic and BMI 30.65 kg/m²  Lab 03/11/21  0856 03/11/21  1335 03/12/21  0659   * 828* 653*   * 477* 258*       Assessment:   Patient who is s/p cholecystectomy. She had a negative IOC.   However, her live chemistries have continued to fluctuate, with el is 7-8/10 and nausea. Medicated for pain with 2 Norco and nausea alternating between zofran and reglan. Pt very uncomfortable and tearful at times. In addition to antonio pain meds also provided pt with hot packs which pt states does provide some relief. °C)-98.7 °F (37.1 °C)] 98.2 °F (36.8 °C)  Pulse:  [57-87] 87  Resp:  [12-22] 16  BP: (111-121)/(66-82) 111/66    ASSESSMENT / PLAN:      1. Biliary colic s.p lap walter 2/73  1. Antiemetic   2. Pain control   3. Monitor LFT  4.  S/p EUS with stent placement discharge home tomorrow      MEDICATIONS ADMINISTERED IN LAST 1 DAY:  dextrose 5 % and 0.9 % NaCl infusion     Date Action Dose Route User    3/14/2021 1511 New Bag (none) Intravenous Xiomara Feldman RN      Potassium Chloride ER (K-DUR M20) CR tab 40 mEq

## 2025-06-04 NOTE — ED PROVIDER NOTE - PROGRESS NOTE DETAILS
Edy: spoke to health care proxy Gualberto (brother) [phone], aware of labs results, negative CT and plan to admit. States he was in renal failure in the past, was on dialysis, last session was 2 years ago. States he has been doing well with his kidneys. States he lives at home with his wheelchair bound wife, he only has aides 4 hours a day. His brother lives in [place] and his son lives in [place], does not believe it is a safe environment at home. Jax: Pt received in signout from Dr. Garcia. Hyperkalemia improved with medications. Admitted to telemetry.

## 2025-06-04 NOTE — ED PROVIDER NOTE - NSICDXPASTMEDICALHX_GEN_ALL_CORE_FT
PAST MEDICAL HISTORY:  DM (diabetes mellitus)     Dyskinesia     H/O Parkinson's disease     History of BPH     History of hypotension     HLD (hyperlipidemia)

## 2025-06-04 NOTE — H&P ADULT - HISTORY OF PRESENT ILLNESS
73 y/o M w/ PMH of Parkinson's dx, dyskinesia, orthostatic hypotension (on midodrine), DM type II, BPH. s/p spinal cord stimulator implant, CAD s/p PCI w/ IRINA x3 4/3/25 w/ Dr. MAGDALENO Jimenez (on ASA and brilinta), CKD s/p renal failure 9/2024 requiring course of HD w/ renal improvement and now off HD, ambulates w/ walker at baseline, presented from home after a mechanical fall while walking in his bedroom yesterday.  Pt states his walker and foot got caught on something on the floor causing him to fall backwards on floor onto his L-hip.  He thinks he hit his head but denies LOC.  Pt did not immediately come to the hospital bc he was able to ambulate however today was found to be confused and in pain.  Pt was given pain meds but AMS worsened and was brought to hospital.  Pt at this time is AAOx3 and able to remember event clearly but is unsure how long he was down for.  Pt denies cp, palpitations, sob, abd pain, N/V, diarrhea, dysuria, has been urinating well, and states urine is not dark in color.

## 2025-06-05 DIAGNOSIS — N17.9 ACUTE KIDNEY FAILURE, UNSPECIFIED: ICD-10-CM

## 2025-06-05 DIAGNOSIS — E78.5 HYPERLIPIDEMIA, UNSPECIFIED: ICD-10-CM

## 2025-06-05 DIAGNOSIS — Z86.69 PERSONAL HISTORY OF OTHER DISEASES OF THE NERVOUS SYSTEM AND SENSE ORGANS: ICD-10-CM

## 2025-06-05 DIAGNOSIS — Z87.438 PERSONAL HISTORY OF OTHER DISEASES OF MALE GENITAL ORGANS: ICD-10-CM

## 2025-06-05 DIAGNOSIS — E11.9 TYPE 2 DIABETES MELLITUS WITHOUT COMPLICATIONS: ICD-10-CM
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
## 2025-06-05 DIAGNOSIS — G93.41 METABOLIC ENCEPHALOPATHY: ICD-10-CM

## 2025-06-05 DIAGNOSIS — E87.5 HYPERKALEMIA: ICD-10-CM

## 2025-06-05 DIAGNOSIS — E87.21 ACUTE METABOLIC ACIDOSIS: ICD-10-CM

## 2025-06-05 LAB
A1C WITH ESTIMATED AVERAGE GLUCOSE RESULT: 6.9 % — HIGH (ref 4–5.6)
ACETONE SERPL-MCNC: NEGATIVE — SIGNIFICANT CHANGE UP
ALBUMIN SERPL ELPH-MCNC: 3.7 G/DL — SIGNIFICANT CHANGE UP (ref 3.3–5.2)
ALP SERPL-CCNC: 81 U/L — SIGNIFICANT CHANGE UP (ref 40–120)
ALT FLD-CCNC: <5 U/L — SIGNIFICANT CHANGE UP
ANION GAP SERPL CALC-SCNC: 10 MMOL/L — SIGNIFICANT CHANGE UP (ref 5–17)
ANION GAP SERPL CALC-SCNC: 11 MMOL/L — SIGNIFICANT CHANGE UP (ref 5–17)
APPEARANCE UR: CLEAR — SIGNIFICANT CHANGE UP
AST SERPL-CCNC: 18 U/L — SIGNIFICANT CHANGE UP
BACTERIA # UR AUTO: NEGATIVE /HPF — SIGNIFICANT CHANGE UP
BASOPHILS # BLD AUTO: 0.05 K/UL — SIGNIFICANT CHANGE UP (ref 0–0.2)
BASOPHILS NFR BLD AUTO: 0.6 % — SIGNIFICANT CHANGE UP (ref 0–2)
BILIRUB SERPL-MCNC: 0.3 MG/DL — LOW (ref 0.4–2)
BILIRUB UR-MCNC: NEGATIVE — SIGNIFICANT CHANGE UP
BLD GP AB SCN SERPL QL: SIGNIFICANT CHANGE UP
BUN SERPL-MCNC: 50.8 MG/DL — HIGH (ref 8–20)
BUN SERPL-MCNC: 51.4 MG/DL — HIGH (ref 8–20)
CALCIUM SERPL-MCNC: 8.7 MG/DL — SIGNIFICANT CHANGE UP (ref 8.4–10.5)
CALCIUM SERPL-MCNC: 8.8 MG/DL — SIGNIFICANT CHANGE UP (ref 8.4–10.5)
CAST: 1 /LPF — SIGNIFICANT CHANGE UP (ref 0–4)
CHLORIDE SERPL-SCNC: 108 MMOL/L — SIGNIFICANT CHANGE UP (ref 96–108)
CHLORIDE SERPL-SCNC: 108 MMOL/L — SIGNIFICANT CHANGE UP (ref 96–108)
CK MB CFR SERPL CALC: 5.6 NG/ML — SIGNIFICANT CHANGE UP (ref 0–6.7)
CK SERPL-CCNC: 214 U/L — HIGH (ref 30–200)
CO2 SERPL-SCNC: 18 MMOL/L — LOW (ref 22–29)
CO2 SERPL-SCNC: 19 MMOL/L — LOW (ref 22–29)
COLOR SPEC: YELLOW — SIGNIFICANT CHANGE UP
CREAT ?TM UR-MCNC: 101 MG/DL — SIGNIFICANT CHANGE UP
CREAT SERPL-MCNC: 1.96 MG/DL — HIGH (ref 0.5–1.3)
CREAT SERPL-MCNC: 2.03 MG/DL — HIGH (ref 0.5–1.3)
DIFF PNL FLD: NEGATIVE — SIGNIFICANT CHANGE UP
EGFR: 33 ML/MIN/1.73M2 — LOW
EGFR: 33 ML/MIN/1.73M2 — LOW
EGFR: 35 ML/MIN/1.73M2 — LOW
EGFR: 35 ML/MIN/1.73M2 — LOW
EOSINOPHIL # BLD AUTO: 0.27 K/UL — SIGNIFICANT CHANGE UP (ref 0–0.5)
EOSINOPHIL NFR BLD AUTO: 3.2 % — SIGNIFICANT CHANGE UP (ref 0–6)
ESTIMATED AVERAGE GLUCOSE: 151 MG/DL — HIGH (ref 68–114)
GLUCOSE BLDC GLUCOMTR-MCNC: 126 MG/DL — HIGH (ref 70–99)
GLUCOSE BLDC GLUCOMTR-MCNC: 173 MG/DL — HIGH (ref 70–99)
GLUCOSE BLDC GLUCOMTR-MCNC: 176 MG/DL — HIGH (ref 70–99)
GLUCOSE BLDC GLUCOMTR-MCNC: 94 MG/DL — SIGNIFICANT CHANGE UP (ref 70–99)
GLUCOSE SERPL-MCNC: 89 MG/DL — SIGNIFICANT CHANGE UP (ref 70–99)
GLUCOSE SERPL-MCNC: 94 MG/DL — SIGNIFICANT CHANGE UP (ref 70–99)
GLUCOSE UR QL: >=1000 MG/DL
HCT VFR BLD CALC: 29.2 % — LOW (ref 39–50)
HGB BLD-MCNC: 9.3 G/DL — LOW (ref 13–17)
IMM GRANULOCYTES # BLD AUTO: 0.03 K/UL — SIGNIFICANT CHANGE UP (ref 0–0.07)
IMM GRANULOCYTES NFR BLD AUTO: 0.4 % — SIGNIFICANT CHANGE UP (ref 0–0.9)
KETONES UR QL: ABNORMAL MG/DL
LACTATE SERPL-SCNC: 0.9 MMOL/L — SIGNIFICANT CHANGE UP (ref 0.5–2)
LEUKOCYTE ESTERASE UR-ACNC: NEGATIVE — SIGNIFICANT CHANGE UP
LYMPHOCYTES # BLD AUTO: 1.26 K/UL — SIGNIFICANT CHANGE UP (ref 1–3.3)
LYMPHOCYTES NFR BLD AUTO: 14.9 % — SIGNIFICANT CHANGE UP (ref 13–44)
MAGNESIUM SERPL-MCNC: 2.3 MG/DL — SIGNIFICANT CHANGE UP (ref 1.6–2.6)
MCHC RBC-ENTMCNC: 29.1 PG — SIGNIFICANT CHANGE UP (ref 27–34)
MCHC RBC-ENTMCNC: 31.8 G/DL — LOW (ref 32–36)
MCV RBC AUTO: 91.3 FL — SIGNIFICANT CHANGE UP (ref 80–100)
MONOCYTES # BLD AUTO: 1 K/UL — HIGH (ref 0–0.9)
MONOCYTES NFR BLD AUTO: 11.9 % — SIGNIFICANT CHANGE UP (ref 2–14)
NEUTROPHILS # BLD AUTO: 5.82 K/UL — SIGNIFICANT CHANGE UP (ref 1.8–7.4)
NEUTROPHILS NFR BLD AUTO: 69 % — SIGNIFICANT CHANGE UP (ref 43–77)
NITRITE UR-MCNC: NEGATIVE — SIGNIFICANT CHANGE UP
NRBC # BLD AUTO: 0 K/UL — SIGNIFICANT CHANGE UP (ref 0–0)
NRBC # FLD: 0 K/UL — SIGNIFICANT CHANGE UP (ref 0–0)
NRBC BLD AUTO-RTO: 0 /100 WBCS — SIGNIFICANT CHANGE UP (ref 0–0)
OSMOLALITY SERPL: 309 MOSMOL/KG — HIGH (ref 280–301)
OSMOLALITY UR: 586 MOSM/KG — SIGNIFICANT CHANGE UP (ref 300–1000)
PH UR: 5.5 — SIGNIFICANT CHANGE UP (ref 5–8)
PHOSPHATE SERPL-MCNC: 3.8 MG/DL — SIGNIFICANT CHANGE UP (ref 2.4–4.7)
PLATELET # BLD AUTO: 283 K/UL — SIGNIFICANT CHANGE UP (ref 150–400)
PMV BLD: 8.9 FL — SIGNIFICANT CHANGE UP (ref 7–13)
POTASSIUM SERPL-MCNC: 5.1 MMOL/L — SIGNIFICANT CHANGE UP (ref 3.5–5.3)
POTASSIUM SERPL-MCNC: 5.2 MMOL/L — SIGNIFICANT CHANGE UP (ref 3.5–5.3)
POTASSIUM SERPL-SCNC: 5.1 MMOL/L — SIGNIFICANT CHANGE UP (ref 3.5–5.3)
POTASSIUM SERPL-SCNC: 5.2 MMOL/L — SIGNIFICANT CHANGE UP (ref 3.5–5.3)
PROT ?TM UR-MCNC: 18 MG/DL — HIGH (ref 0–12)
PROT SERPL-MCNC: 6.4 G/DL — LOW (ref 6.6–8.7)
PROT UR-MCNC: SIGNIFICANT CHANGE UP MG/DL
PROT/CREAT UR-RTO: 0.2 RATIO — SIGNIFICANT CHANGE UP
RBC # BLD: 3.2 M/UL — LOW (ref 4.2–5.8)
RBC # FLD: 14.6 % — HIGH (ref 10.3–14.5)
RBC CASTS # UR COMP ASSIST: 1 /HPF — SIGNIFICANT CHANGE UP (ref 0–4)
SODIUM SERPL-SCNC: 137 MMOL/L — SIGNIFICANT CHANGE UP (ref 135–145)
SODIUM SERPL-SCNC: 137 MMOL/L — SIGNIFICANT CHANGE UP (ref 135–145)
SP GR SPEC: 1.02 — SIGNIFICANT CHANGE UP (ref 1–1.03)
SQUAMOUS # UR AUTO: 1 /HPF — SIGNIFICANT CHANGE UP (ref 0–5)
T4 FREE SERPL-MCNC: 1.2 NG/DL — SIGNIFICANT CHANGE UP (ref 0.9–1.8)
TSH SERPL-MCNC: 0.2 UIU/ML — LOW (ref 0.27–4.2)
URATE SERPL-MCNC: 7.8 MG/DL — HIGH (ref 3.4–7)
UROBILINOGEN FLD QL: 0.2 MG/DL — SIGNIFICANT CHANGE UP (ref 0.2–1)
WBC # BLD: 8.43 K/UL — SIGNIFICANT CHANGE UP (ref 3.8–10.5)
WBC # FLD AUTO: 8.43 K/UL — SIGNIFICANT CHANGE UP (ref 3.8–10.5)
WBC UR QL: 0 /HPF — SIGNIFICANT CHANGE UP (ref 0–5)

## 2025-06-05 PROCEDURE — 99222 1ST HOSP IP/OBS MODERATE 55: CPT

## 2025-06-05 PROCEDURE — 99233 SBSQ HOSP IP/OBS HIGH 50: CPT

## 2025-06-05 RX ORDER — GABAPENTIN 400 MG/1
200 CAPSULE ORAL THREE TIMES A DAY
Refills: 0 | Status: DISCONTINUED | OUTPATIENT
Start: 2025-06-05 | End: 2025-06-06

## 2025-06-05 RX ORDER — DEXTROSE 50 % IN WATER 50 %
12.5 SYRINGE (ML) INTRAVENOUS ONCE
Refills: 0 | Status: DISCONTINUED | OUTPATIENT
Start: 2025-06-05 | End: 2025-06-06

## 2025-06-05 RX ORDER — GLUCAGON 3 MG/1
1 POWDER NASAL ONCE
Refills: 0 | Status: DISCONTINUED | OUTPATIENT
Start: 2025-06-05 | End: 2025-06-06

## 2025-06-05 RX ORDER — TRAZODONE HCL 100 MG
1 TABLET ORAL
Refills: 0 | DISCHARGE

## 2025-06-05 RX ORDER — DEXTROSE 50 % IN WATER 50 %
15 SYRINGE (ML) INTRAVENOUS ONCE
Refills: 0 | Status: DISCONTINUED | OUTPATIENT
Start: 2025-06-05 | End: 2025-06-06

## 2025-06-05 RX ORDER — HEPARIN SODIUM 1000 [USP'U]/ML
5000 INJECTION INTRAVENOUS; SUBCUTANEOUS EVERY 12 HOURS
Refills: 0 | Status: DISCONTINUED | OUTPATIENT
Start: 2025-06-05 | End: 2025-06-06

## 2025-06-05 RX ORDER — SODIUM CHLORIDE 9 G/1000ML
1000 INJECTION, SOLUTION INTRAVENOUS
Refills: 0 | Status: DISCONTINUED | OUTPATIENT
Start: 2025-06-05 | End: 2025-06-06

## 2025-06-05 RX ORDER — ACETAMINOPHEN 500 MG/5ML
650 LIQUID (ML) ORAL EVERY 6 HOURS
Refills: 0 | Status: DISCONTINUED | OUTPATIENT
Start: 2025-06-05 | End: 2025-06-06

## 2025-06-05 RX ORDER — LABETALOL HYDROCHLORIDE 200 MG/1
100 TABLET, FILM COATED ORAL
Refills: 0 | Status: DISCONTINUED | OUTPATIENT
Start: 2025-06-05 | End: 2025-06-06

## 2025-06-05 RX ORDER — MELATONIN 5 MG
3 TABLET ORAL AT BEDTIME
Refills: 0 | Status: DISCONTINUED | OUTPATIENT
Start: 2025-06-05 | End: 2025-06-06

## 2025-06-05 RX ORDER — LABETALOL HYDROCHLORIDE 200 MG/1
1 TABLET, FILM COATED ORAL
Refills: 0 | DISCHARGE

## 2025-06-05 RX ORDER — TICAGRELOR 90 MG/1
90 TABLET ORAL EVERY 12 HOURS
Refills: 0 | Status: DISCONTINUED | OUTPATIENT
Start: 2025-06-05 | End: 2025-06-06

## 2025-06-05 RX ORDER — TAMSULOSIN HYDROCHLORIDE 0.4 MG/1
0.4 CAPSULE ORAL AT BEDTIME
Refills: 0 | Status: DISCONTINUED | OUTPATIENT
Start: 2025-06-05 | End: 2025-06-06

## 2025-06-05 RX ORDER — GABAPENTIN 400 MG/1
2 CAPSULE ORAL
Refills: 0 | DISCHARGE

## 2025-06-05 RX ORDER — ALPRAZOLAM 0.5 MG
0.25 TABLET, EXTENDED RELEASE 24 HR ORAL AT BEDTIME
Refills: 0 | Status: DISCONTINUED | OUTPATIENT
Start: 2025-06-05 | End: 2025-06-06

## 2025-06-05 RX ORDER — CARBIDOPA/LEVODOPA 25MG-100MG
1 TABLET ORAL
Refills: 0 | Status: DISCONTINUED | OUTPATIENT
Start: 2025-06-05 | End: 2025-06-06

## 2025-06-05 RX ORDER — DEXTROSE 50 % IN WATER 50 %
25 SYRINGE (ML) INTRAVENOUS ONCE
Refills: 0 | Status: DISCONTINUED | OUTPATIENT
Start: 2025-06-05 | End: 2025-06-06

## 2025-06-05 RX ORDER — FINASTERIDE 1 MG/1
5 TABLET, FILM COATED ORAL DAILY
Refills: 0 | Status: DISCONTINUED | OUTPATIENT
Start: 2025-06-05 | End: 2025-06-06

## 2025-06-05 RX ORDER — MIRTAZAPINE 30 MG/1
15 TABLET, FILM COATED ORAL AT BEDTIME
Refills: 0 | Status: DISCONTINUED | OUTPATIENT
Start: 2025-06-05 | End: 2025-06-06

## 2025-06-05 RX ORDER — MIDODRINE HYDROCHLORIDE 5 MG/1
1 TABLET ORAL
Refills: 0 | DISCHARGE

## 2025-06-05 RX ORDER — OXYCODONE HYDROCHLORIDE 30 MG/1
5 TABLET ORAL
Refills: 0 | Status: DISCONTINUED | OUTPATIENT
Start: 2025-06-05 | End: 2025-06-06

## 2025-06-05 RX ORDER — TICAGRELOR 90 MG/1
1 TABLET ORAL
Refills: 0 | DISCHARGE

## 2025-06-05 RX ORDER — MAGNESIUM, ALUMINUM HYDROXIDE 200-200 MG
30 TABLET,CHEWABLE ORAL EVERY 4 HOURS
Refills: 0 | Status: DISCONTINUED | OUTPATIENT
Start: 2025-06-05 | End: 2025-06-06

## 2025-06-05 RX ORDER — ONDANSETRON HCL/PF 4 MG/2 ML
4 VIAL (ML) INJECTION EVERY 8 HOURS
Refills: 0 | Status: DISCONTINUED | OUTPATIENT
Start: 2025-06-05 | End: 2025-06-06

## 2025-06-05 RX ORDER — ROSUVASTATIN CALCIUM 20 MG/1
20 TABLET, FILM COATED ORAL AT BEDTIME
Refills: 0 | Status: DISCONTINUED | OUTPATIENT
Start: 2025-06-05 | End: 2025-06-06

## 2025-06-05 RX ORDER — CYCLOBENZAPRINE HYDROCHLORIDE 15 MG/1
5 CAPSULE, EXTENDED RELEASE ORAL AT BEDTIME
Refills: 0 | Status: DISCONTINUED | OUTPATIENT
Start: 2025-06-05 | End: 2025-06-06

## 2025-06-05 RX ORDER — INSULIN LISPRO 100 U/ML
INJECTION, SOLUTION INTRAVENOUS; SUBCUTANEOUS
Refills: 0 | Status: DISCONTINUED | OUTPATIENT
Start: 2025-06-05 | End: 2025-06-06

## 2025-06-05 RX ORDER — MIDODRINE HYDROCHLORIDE 5 MG/1
10 TABLET ORAL THREE TIMES A DAY
Refills: 0 | Status: DISCONTINUED | OUTPATIENT
Start: 2025-06-05 | End: 2025-06-06

## 2025-06-05 RX ORDER — CYCLOBENZAPRINE HYDROCHLORIDE 15 MG/1
1 CAPSULE, EXTENDED RELEASE ORAL
Refills: 0 | DISCHARGE

## 2025-06-05 RX ORDER — ASPIRIN 325 MG
81 TABLET ORAL DAILY
Refills: 0 | Status: DISCONTINUED | OUTPATIENT
Start: 2025-06-05 | End: 2025-06-06

## 2025-06-05 RX ADMIN — Medication 70 MILLILITER(S): at 04:57

## 2025-06-05 RX ADMIN — GABAPENTIN 200 MILLIGRAM(S): 400 CAPSULE ORAL at 21:18

## 2025-06-05 RX ADMIN — ROSUVASTATIN CALCIUM 20 MILLIGRAM(S): 20 TABLET, FILM COATED ORAL at 21:18

## 2025-06-05 RX ADMIN — Medication 40 MILLIGRAM(S): at 06:03

## 2025-06-05 RX ADMIN — MIDODRINE HYDROCHLORIDE 10 MILLIGRAM(S): 5 TABLET ORAL at 13:02

## 2025-06-05 RX ADMIN — HEPARIN SODIUM 5000 UNIT(S): 1000 INJECTION INTRAVENOUS; SUBCUTANEOUS at 06:05

## 2025-06-05 RX ADMIN — INSULIN LISPRO 1: 100 INJECTION, SOLUTION INTRAVENOUS; SUBCUTANEOUS at 18:01

## 2025-06-05 RX ADMIN — TAMSULOSIN HYDROCHLORIDE 0.4 MILLIGRAM(S): 0.4 CAPSULE ORAL at 21:18

## 2025-06-05 RX ADMIN — FINASTERIDE 5 MILLIGRAM(S): 1 TABLET, FILM COATED ORAL at 13:02

## 2025-06-05 RX ADMIN — MIRTAZAPINE 15 MILLIGRAM(S): 30 TABLET, FILM COATED ORAL at 21:18

## 2025-06-05 RX ADMIN — Medication 1 TABLET(S): at 22:41

## 2025-06-05 RX ADMIN — LABETALOL HYDROCHLORIDE 100 MILLIGRAM(S): 200 TABLET, FILM COATED ORAL at 18:02

## 2025-06-05 RX ADMIN — Medication 81 MILLIGRAM(S): at 13:02

## 2025-06-05 RX ADMIN — CYCLOBENZAPRINE HYDROCHLORIDE 5 MILLIGRAM(S): 15 CAPSULE, EXTENDED RELEASE ORAL at 21:18

## 2025-06-05 RX ADMIN — Medication 1 TABLET(S): at 06:02

## 2025-06-05 RX ADMIN — Medication 1 TABLET(S): at 13:02

## 2025-06-05 RX ADMIN — TICAGRELOR 90 MILLIGRAM(S): 90 TABLET ORAL at 18:02

## 2025-06-05 RX ADMIN — Medication 1 TABLET(S): at 18:02

## 2025-06-05 RX ADMIN — HEPARIN SODIUM 5000 UNIT(S): 1000 INJECTION INTRAVENOUS; SUBCUTANEOUS at 18:02

## 2025-06-05 RX ADMIN — Medication 1 TABLET(S): at 08:28

## 2025-06-05 RX ADMIN — GABAPENTIN 200 MILLIGRAM(S): 400 CAPSULE ORAL at 13:07

## 2025-06-05 RX ADMIN — Medication 70 MILLILITER(S): at 13:02

## 2025-06-05 RX ADMIN — TICAGRELOR 90 MILLIGRAM(S): 90 TABLET ORAL at 06:03

## 2025-06-05 RX ADMIN — GABAPENTIN 200 MILLIGRAM(S): 400 CAPSULE ORAL at 06:03

## 2025-06-05 NOTE — CONSULT NOTE ADULT - ASSESSMENT
74 YOM with PMH of Parkinson disease, dyskinesia, orthostatic hypotension, DM, BPH, CAD, CKD with history of requiring hemodialysis presented after mechanical fall and altered mental status.  Workup with leukocytosis, hyperkalemia, elevated serum creatinine.  Imaging with no acute findings.  Nephrology consulted for VALARIE.    VALARIE on CKD 3  ·	Baseline serum creatinine 1.24 on 3/27/2025  ·	Elevated to 2.54 on admission  ·	Patient has history of acute renal failure requiring hemodialysis 09/2024 with subsequent recovery  ·	UA bland except glucosuria  ·	Imaging with no hydronephrosis  ·	[place] likely combination prerenal; hypotension, volume depletion, concomitant ARB use  ·	Holding ARB  ·	Continue on normal saline at 70 cc/h  ·	Serum creatinine improving down to 1.96 today.  ·	Continue with current management, will trend Scr    Orthostatic hypotension associated with parkinsonism: Placed on midodrine, holding antihypertensives.  Maintain MAP more than 65  BPH: Continue on tamsulosin and finasteride  Parkinsonism: Continue on carbidopa levodopa

## 2025-06-05 NOTE — PHYSICAL THERAPY INITIAL EVALUATION ADULT - ADDITIONAL COMMENTS
Pt lives with wife in a 2 story house with 1 step to enter. All needs are met on the 1st floor. Modified Independent with all with RW.

## 2025-06-05 NOTE — PROGRESS NOTE ADULT - SUBJECTIVE AND OBJECTIVE BOX
< from: CT Lumbar Spine Reform No Cont (06.04.25 @ 21:12) >  On the right, there is mild L3-4, mild to moderate L4-5, and mild L5-S1   foraminal narrowing. On the left, there is mild L1-2, mild to moderate   L2-3, moderate L3-4, and mild to moderate L4-5 foraminal narrowing.    No acute fractures identified. The facets show no evidence of dislocation   or perching. No evidence of a hematoma or edema in the paraspinal   musculature and soft tissues. Vertebral body height is intact throughout.   MRI would be more sensitive to soft tissue encroachment on neural   elements, ligamentous injury, subdural/epidural hemorrhage, and cord   contusion.    IMPRESSION:    1.  No acute fractures identified.    < end of copied text >  < from: CT Abdomen and Pelvis No Cont (06.04.25 @ 21:05) >  No acute traumatic injury within the chest, abdomen or pelvis.    Heterogeneous thyroid gland with enlarged left thyroid lobe and suspected   underlying 3.5 cm nodule. Nonemergent ultrasound recommended to further   evaluate.    < end of copied text >  < from: CT Head No Cont (06.04.25 @ 21:02) >    IMPRESSION:    CT HEAD:  No acute intracranial hemorrhage, mass effect, or midline shift.    CT CERVICAL SPINE:  No acute fracture or traumatic subluxation.    Multi-level degenerative changes.    < end of copied text >       Patient is a 74y old  Male who presents with a chief complaint of Acute renal failure (05 Jun 2025 07:29)      Pt is c/o fatigue. Denies any pain. aaox3 now   REVIEW OF SYSTEMS: All systems are reviewed and found to be negative except above    MEDICATIONS  (STANDING):  aspirin  chewable 81 milliGRAM(s) Oral daily  carbidopa/levodopa  25/100 1 Tablet(s) Oral <User Schedule>  cyclobenzaprine 5 milliGRAM(s) Oral at bedtime  dextrose 5%. 1000 milliLiter(s) (50 mL/Hr) IV Continuous <Continuous>  dextrose 5%. 1000 milliLiter(s) (100 mL/Hr) IV Continuous <Continuous>  dextrose 50% Injectable 25 Gram(s) IV Push once  dextrose 50% Injectable 12.5 Gram(s) IV Push once  dextrose 50% Injectable 25 Gram(s) IV Push once  finasteride 5 milliGRAM(s) Oral daily  gabapentin 200 milliGRAM(s) Oral three times a day  glucagon  Injectable 1 milliGRAM(s) IntraMuscular once  heparin   Injectable 5000 Unit(s) SubCutaneous every 12 hours  insulin lispro (ADMELOG) corrective regimen sliding scale   SubCutaneous three times a day before meals  labetalol 100 milliGRAM(s) Oral two times a day  midodrine. 10 milliGRAM(s) Oral three times a day  mirtazapine 15 milliGRAM(s) Oral at bedtime  pantoprazole    Tablet 40 milliGRAM(s) Oral before breakfast  rosuvastatin 20 milliGRAM(s) Oral at bedtime  sodium chloride 0.9%. 1000 milliLiter(s) (70 mL/Hr) IV Continuous <Continuous>  tamsulosin 0.4 milliGRAM(s) Oral at bedtime  ticagrelor 90 milliGRAM(s) Oral every 12 hours    MEDICATIONS  (PRN):  acetaminophen     Tablet .. 650 milliGRAM(s) Oral every 6 hours PRN Temp greater or equal to 38C (100.4F), Mild Pain (1 - 3)  ALPRAZolam 0.25 milliGRAM(s) Oral at bedtime PRN anxiety / sleep  aluminum hydroxide/magnesium hydroxide/simethicone Suspension 30 milliLiter(s) Oral every 4 hours PRN Dyspepsia  dextrose Oral Gel 15 Gram(s) Oral once PRN Blood Glucose LESS THAN 70 milliGRAM(s)/deciliter  melatonin 3 milliGRAM(s) Oral at bedtime PRN Insomnia  ondansetron Injectable 4 milliGRAM(s) IV Push every 8 hours PRN Nausea and/or Vomiting  oxyCODONE    IR 5 milliGRAM(s) Oral two times a day PRN Moderate Pain (4 - 6)      CAPILLARY BLOOD GLUCOSE      POCT Blood Glucose.: 126 mg/dL (05 Jun 2025 13:11)  POCT Blood Glucose.: 176 mg/dL (05 Jun 2025 11:53)  POCT Blood Glucose.: 94 mg/dL (05 Jun 2025 07:58)  POCT Blood Glucose.: 162 mg/dL (04 Jun 2025 20:38)  POCT Blood Glucose.: 103 mg/dL (04 Jun 2025 19:59)  POCT Blood Glucose.: 96 mg/dL (04 Jun 2025 18:29)    I&O's Summary      PHYSICAL EXAM:  Vital Signs Last 24 Hrs  T(C): 36.4 (05 Jun 2025 11:29), Max: 37 (04 Jun 2025 15:16)  T(F): 97.5 (05 Jun 2025 11:29), Max: 98.6 (04 Jun 2025 15:16)  HR: 62 (05 Jun 2025 11:29) (57 - 72)  BP: 108/62 (05 Jun 2025 11:29) (108/62 - 135/71)  BP(mean): --  RR: 18 (05 Jun 2025 11:29) (17 - 18)  SpO2: 97% (05 Jun 2025 11:29) (97% - 99%)    Parameters below as of 05 Jun 2025 11:29  Patient On (Oxygen Delivery Method): room air        CONSTITUTIONAL: NAD,  EYES: PERRLA; conjunctiva and sclera clear  ENMT: Moist oral mucosa,   RESPIRATORY: Normal respiratory effort; lungs are clear to auscultation bilaterally  CARDIOVASCULAR: Regular rate and rhythm, normal S1 and S2, no murmur   EXTS: No lower extremity edema; Peripheral pulses are 2+ bilaterally  ABDOMEN: Nontender to palpation, normoactive bowel sounds, no rebound/guarding;   MUSCLOSKELETAL:   no joint swelling or tenderness to palpation  PSYCH: coop  NEUROLOGY: A+O to person, place, and time; CN 2-12 are intact and symmetric; no gross sensory deficits;       LABS:                        9.3    8.43  )-----------( 283      ( 05 Jun 2025 04:30 )             29.2     06-05    137  |  108  |  50.8[H]  ----------------------------<  89  5.1   |  19.0[L]  |  1.96[H]    Ca    8.7      05 Jun 2025 04:30  Phos  3.8     06-05  Mg     2.3     06-05    TPro  6.4[L]  /  Alb  3.7  /  TBili  0.3[L]  /  DBili  x   /  AST  18  /  ALT  <5  /  AlkPhos  81  06-05    PT/INR - ( 04 Jun 2025 16:22 )   PT: 12.1 sec;   INR: 1.07 ratio         PTT - ( 04 Jun 2025 16:22 )  PTT:32.3 sec  CARDIAC MARKERS ( 05 Jun 2025 04:30 )  x     / x     / x     / x     / 5.6 ng/mL  CARDIAC MARKERS ( 04 Jun 2025 16:22 )  x     / x     / x     / x     / 8.3 ng/mL      Urinalysis Basic - ( 05 Jun 2025 04:30 )    Color: x / Appearance: x / SG: x / pH: x  Gluc: 89 mg/dL / Ketone: x  / Bili: x / Urobili: x   Blood: x / Protein: x / Nitrite: x   Leuk Esterase: x / RBC: x / WBC x   Sq Epi: x / Non Sq Epi: x / Bacteria: x          RADIOLOGY & ADDITIONAL TESTS:  Results Reviewed:   Imaging Personally Reviewed:  Electrocardiogram Personally Reviewed:    COORDINATION OF CARE:  Care Discussed with Consultants/Other Providers [Y/N]:  Prior or Outpatient Records Reviewed [Y/N]:              < from: CT Lumbar Spine Reform No Cont (06.04.25 @ 21:12) >  On the right, there is mild L3-4, mild to moderate L4-5, and mild L5-S1   foraminal narrowing. On the left, there is mild L1-2, mild to moderate   L2-3, moderate L3-4, and mild to moderate L4-5 foraminal narrowing.    No acute fractures identified. The facets show no evidence of dislocation   or perching. No evidence of a hematoma or edema in the paraspinal   musculature and soft tissues. Vertebral body height is intact throughout.   MRI would be more sensitive to soft tissue encroachment on neural   elements, ligamentous injury, subdural/epidural hemorrhage, and cord   contusion.    IMPRESSION:    1.  No acute fractures identified.    < end of copied text >  < from: CT Abdomen and Pelvis No Cont (06.04.25 @ 21:05) >  No acute traumatic injury within the chest, abdomen or pelvis.    Heterogeneous thyroid gland with enlarged left thyroid lobe and suspected   underlying 3.5 cm nodule. Nonemergent ultrasound recommended to further   evaluate.    < end of copied text >  < from: CT Head No Cont (06.04.25 @ 21:02) >    IMPRESSION:    CT HEAD:  No acute intracranial hemorrhage, mass effect, or midline shift.    CT CERVICAL SPINE:  No acute fracture or traumatic subluxation.    Multi-level degenerative changes.    < end of copied text >

## 2025-06-05 NOTE — PROGRESS NOTE ADULT - ASSESSMENT
73 y/o M w/ PMH of Parkinson's dx, dyskinesia, orthostatic hypotension (on midodrine), DM type II, BPH. ?prostate CA, s/p spinal cord stimulator implant, CAD s/p PCI w/ IRINA x3 4/3/25 w/ Dr. MAGDALENO Jimenez (on ASA and brilinta), CKD s/p renal failure 9/2024 requiring course of HD w/ renal improvement and now off HD, ambulates w/ walker at baseline, presented from home after a mechanical fall while walking in his bedroom yesterday and AMS.  Pt at this time is AAOx3 and able to remember event clearly but is unsure how long he was down for.  VS WNL.  Clinically hypovolemic but mentation near baseline.   Initial w/u significant for WBC 12K, K6.8-->5.4, HCO3 15-->19, AG22-->17, BUN52/2.54-->55/2.42.  CTH and Cspine w/ no acute findings.  CT c/a/p w/ no tramatic injury within chest, abd or pelvis, no kidney stones or hydronephrosis.  s/p 1L NS bolus, 4u insulin/dextrose cocktail, 1amp NaHCO3, 10g lokelma, 2g calcium gluc, 0.5mg IV ativan, 2mg IV morphine x2 in ED.  Admit for acute renal failure.      Acute metabolic encephalopathy likely 2/2 acute renal failure   Acute renal failure on CKD, possibly multifactorial from dehydration and  r/o metformin induced ARF  Acute metabolic acidosis  Hyperkalemia   -Tele  - Mentation near baseline at this time   - Cr 1.9 . Last known Cr 1.24 on 3/27/25 but on initial w/u was 2.54   - K improve. co2 19  - s/p 4u insulin/dextrose 1amp NaHCO3, 10g lokelma, 2g calcium gluc and 1L NS bolus  - hx of acute renal failure requiring HD 09/2024 w/ improved renal function and now off HD   - CT c/a/p: kidneys without stones or hydronephrosis, prostate enlarged and protrudes into post wall of urinary bladder   -  lactate level stable   - Hold ARB given hyperkalemia and ARF   - Monitor strict I/O's, renal function and lytes   - Bladder scan q6h,i/o  - Nephrology consulted ([facility])                Fall likely related to parkinson's w/ autonomic dysregulation    Mild rhabdo,  - Pt reports it was a mechanical fall and had no near syncopal prodrome prior to fall   - CTH and Cspine w/ no acute findings   - CT c/a/p w/ no traumatic injury within chest, abd or pelvis  -CT L-spine L3-S1 disc disease  - Will check orthostats   - On midodrine and carbidopa/levodopa  - c/w mirtazapine   - Fall and aspiration precautions   - Creatine Kinase: 214 U/L  - PT consulted         Chronic normocytic anemia  - Slight down trend in H/H from 11.3-->10.1 -->9.3 since 03/2025  - Is on DAPT and will place on PPI while inpt given also placing on heparin sq but only BID for VTE ppx   - Hemolysis considered given hyperkalemia but Tbili normal  - Will check iron panel and smear for schistocytes   - Monitor CBC and transfuse for Hb<8       CAD s/p PCI 4/3/25  HTN / HLD  - c/w uninterrupted ASA and brilinta given recent PCI  - Hold ARB given current renal function   - c/w BB and statin       DM type II  - f/u A1cA1C with Estimated Average Glucose Result: 6.9 %  - Hold farxiga and metformin   - ISS and hypoglycemic protocol in place       BPH w/ ?prostate CA  - c/w tamsulosin and finasteride   - Caution w/ bph meds as can precipitate orthostasis   - CT shows Radiation seeds in the prostate gland which would be used for prostate CA      Chronic pain / Dyskinesias  - Confirmed by admitting physician  Xanax 0.25mg qhs and oxycodone 5mg BID prn on ISTOP (reference #: [id number])  - c/w cyclobenzaprine and gabapentin      Incidental CT findings   - Heterogeneous thyroid gland with enlarged left thyroid lobe and suspected underlying 3.5 cm nodule  -check tsh,FT4  - Nonemergent outpt US recommended to further evaluate        VTE ppx: heparin sq  gi ppx : PPI    Dispo: Acute. pending improvement of renal function   75 y/o M w/ PMH of Parkinson's dx, dyskinesia, orthostatic hypotension (on midodrine), DM type II, BPH. ?prostate CA, s/p spinal cord stimulator implant, CAD s/p PCI w/ IRINA x3 4/3/25 w/ Dr. MAGDALENO Jimenez (on ASA and brilinta), CKD s/p renal failure 9/2024 requiring course of HD w/ renal improvement and now off HD, ambulates w/ walker at baseline, presented from home after a mechanical fall while walking in his bedroom yesterday and AMS.  Pt at this time is AAOx3 and able to remember event clearly but is unsure how long he was down for.  VS WNL.  Clinically hypovolemic but mentation near baseline.   Initial w/u significant for WBC 12K, K6.8-->5.4, HCO3 15-->19, AG22-->17, BUN52/2.54-->55/2.42.  CTH and Cspine w/ no acute findings.  CT c/a/p w/ no tramatic injury within chest, abd or pelvis, no kidney stones or hydronephrosis.  s/p 1L NS bolus, 4u insulin/dextrose cocktail, 1amp NaHCO3, 10g lokelma, 2g calcium gluc, 0.5mg IV ativan, 2mg IV morphine x2 in ED.  Admit for acute renal failure.      Acute metabolic encephalopathy likely 2/2 acute renal failure   Acute renal failure on CKD, possibly multifactorial from dehydration and  r/o metformin induced ARF  Acute metabolic acidosis  Hyperkalemia   -Tele  - Mentation near baseline at this time   - Cr 1.9 . Last known Cr 1.24 on 3/27/25 but on initial w/u was 2.54   - K improve. co2 19  - s/p 4u insulin/dextrose 1amp NaHCO3, 10g lokelma, 2g calcium gluc and 1L NS bolus  - hx of acute renal failure requiring HD 09/2024 w/ improved renal function and now off HD   - CT c/a/p: kidneys without stones or hydronephrosis, prostate enlarged and protrudes into post wall of urinary bladder   -  lactate level stable   - Hold ARB given hyperkalemia and ARF   - Monitor strict I/O's, renal function and lytes   - Bladder scan q6h,i/o  - Nephrology consulted ([facility])                Fall likely related to parkinson's w/ autonomic dysregulation    Mild rhabdo,  - Pt reports it was a mechanical fall and had no near syncopal prodrome prior to fall   - CTH and Cspine w/ no acute findings   - CT c/a/p w/ no traumatic injury within chest, abd or pelvis  -CT L-spine L3-S1 disc disease  - Will check orthostats   - home on midodrine and carbidopa/levodopa  - c/w mirtazapine   - Fall and aspiration precautions   - Creatine Kinase: 214 U/L  - PT consulted         Chronic normocytic anemia  - Slight down trend in H/H from 11.3-->10.1 -->9.3 since 03/2025  - Is on DAPT and will place on PPI while inpt given also placing on heparin sq but only BID for VTE ppx   - Hemolysis considered given hyperkalemia but Tbili normal  - Will check iron panel and smear for schistocytes   - Monitor CBC and transfuse for Hb<8       CAD s/p PCI 4/3/25  HTN / HLD  - c/w uninterrupted ASA and brilinta given recent PCI  - Hold ARB given current renal function   - c/w BB and statin       DM type II  - f/u A1cA1C with Estimated Average Glucose Result: 6.9 %  - Hold farxiga and metformin   - ISS and hypoglycemic protocol in place       BPH w/ ?prostate CA  - c/w tamsulosin and finasteride   - Caution w/ bph meds as can precipitate orthostasis   - CT shows Radiation seeds in the prostate gland which would be used for prostate CA      Chronic pain / Dyskinesias  - Confirmed by admitting physician  Xanax 0.25mg qhs and oxycodone 5mg BID prn on ISTOP (reference #: [id number])  - c/w cyclobenzaprine and gabapentin      Incidental CT findings   - Heterogeneous thyroid gland with enlarged left thyroid lobe and suspected underlying 3.5 cm nodule  -check tsh,FT4  - Nonemergent outpt US recommended to further evaluate        VTE ppx: heparin sq  gi ppx : PPI    Dispo: Acute. pending improvement of renal function       75 y/o M w/ PMH of Parkinson's dx, dyskinesia, orthostatic hypotension (on midodrine), DM type II, BPH. ?prostate CA, s/p spinal cord stimulator implant, CAD s/p PCI w/ IRINA x3 4/3/25 w/ Dr. MAGDALENO Jimenez (on ASA and brilinta), CKD s/p renal failure 9/2024 requiring course of HD w/ renal improvement and now off HD, ambulates w/ walker at baseline, presented from home after a mechanical fall while walking in his bedroom yesterday and AMS.  Pt at this time is AAOx3 and able to remember event clearly but is unsure how long he was down for.  VS WNL.  Clinically hypovolemic but mentation near baseline.   Initial w/u significant for WBC 12K, K6.8-->5.4, HCO3 15-->19, AG22-->17, BUN52/2.54-->55/2.42.  CTH and Cspine w/ no acute findings.  CT c/a/p w/ no tramatic injury within chest, abd or pelvis, no kidney stones or hydronephrosis.  s/p 1L NS bolus, 4u insulin/dextrose cocktail, 1amp NaHCO3, 10g lokelma, 2g calcium gluc, 0.5mg IV ativan, 2mg IV morphine x2 in ED.  Admit for acute renal failure.      Acute metabolic encephalopathy likely 2/2 acute renal failure   Acute renal failure on CKD, possibly multifactorial from dehydration and  r/o metformin induced ARF  Acute metabolic acidosis  Hyperkalemia   -Tele  - Mentation near baseline at this time   - Cr 1.9 . Last known Cr 1.24 on 3/27/25 but on initial w/u was 2.54   - K improve. co2 19  - s/p 4u insulin/dextrose 1amp NaHCO3, 10g lokelma, 2g calcium gluc and 1L NS bolus  - hx of acute renal failure requiring HD 09/2024 w/ improved renal function and now off HD   - CT c/a/p: kidneys without stones or hydronephrosis, prostate enlarged and protrudes into post wall of urinary bladder   -  lactate level stable   - Hold ARB given hyperkalemia and ARF   - Monitor strict I/O's, renal function and lytes   - Bladder scan q6h,i/o  - Nephrology consulted ([facility])                Fall likely related to parkinson's w/ autonomic dysregulation    Mild rhabdo,  - Pt reports it was a mechanical fall and had no near syncopal prodrome prior to fall   - CTH and Cspine w/ no acute findings   - CT c/a/p w/ no traumatic injury within chest, abd or pelvis  -CT L-spine L3-S1 disc disease  - Will check orthostats   - home on midodrine and carbidopa/levodopa  - c/w mirtazapine   - Fall and aspiration precautions   - Creatine Kinase: 214 U/L  - PT consulted         Chronic normocytic anemia  - Slight down trend in H/H from 11.3-->10.1 -->9.3 since 03/2025  - Is on DAPT and will place on PPI while inpt given also placing on heparin sq but only BID for VTE ppx   - Hemolysis considered given hyperkalemia but Tbili normal  - Will check iron panel and smear for schistocytes   - Monitor CBC and transfuse for Hb<8       CAD s/p PCI 4/3/25  HTN / HLD  - c/w uninterrupted ASA and brilinta given recent PCI  - Hold ARB given current renal function   - c/w BB and statin       DM type II  - f/u A1cA1C with Estimated Average Glucose Result: 6.9 %  - Hold farxiga and metformin   - ISS and hypoglycemic protocol in place       BPH w/ ?prostate CA  - c/w tamsulosin and finasteride   - Caution w/ bph meds as can precipitate orthostasis   - CT shows Radiation seeds in the prostate gland which would be used for prostate CA      Chronic pain / Dyskinesias  - Confirmed by admitting physician  Xanax 0.25mg qhs and oxycodone 5mg BID prn on ISTOP (reference #: [id number])  - c/w cyclobenzaprine and gabapentin      Incidental CT findings   - Heterogeneous thyroid gland with enlarged left thyroid lobe and suspected underlying 3.5 cm nodule  -check tsh,FT4  - Nonemergent outpt US recommended to further evaluate        VTE ppx: heparin sq  gi ppx : PPI    Dispo: Acute. pending improvement of renal function    Spoke with nephrology -will f/u .

## 2025-06-05 NOTE — PROVIDER CONTACT NOTE (MEDICATION) - ACTION/TREATMENT ORDERED:
NG tube placed  Aggressive SLP   Plan for PEG 6/13   Dr. Layne Ge made aware of pt SBP and HR. As per Dr. Layne Ge hold labetalol and midodrine.

## 2025-06-05 NOTE — CONSULT NOTE ADULT - SUBJECTIVE AND OBJECTIVE BOX
History of present illness: 74 YOM with PMH of Parkinson disease, dyskinesia, orthostatic hypotension, DM, BPH, CAD, CKD with history of requiring hemodialysis presented after mechanical fall and altered mental status.  Workup with leukocytosis, hyperkalemia, elevated serum creatinine.  Imaging with no acute findings.  Nephrology consulted for VALARIE.  He denied increased urination frequency, dysuria, gross hematuria, fever, chills, skin rash or itching, nausea, vomiting, diarrhea, bleeding problems and joint pain or swelling. Review of other systems was negative.    Review of systems: All systems were reviewed in detail, pertinent positive and negative have been mentioned above, otherwise negative.  Past medical and surgical history: Hypertension, diabetes, dyskinesia, Parkinson disease, BPH, hyperlipidemia, CKD, loop recorder, PCI, status post insertion of spinal cord stimulator.  Allergies: NKDA  Home Medications:   * Patient Currently Takes Medications as of 05-Jun-2025 01:02 documented in Structured Notes  · 	Xanax 0.25 mg oral tablet: Last Dose Taken:  , 1 tab(s) orally prn  · 	aspirin 81 mg oral tablet: Last Dose Taken:  , 1 orally once a day  · 	oxyCODONE 5 mg oral tablet: Last Dose Taken:  , 1 tab(s) orally 2 times a day as needed for pain  · 	finasteride 5 mg oral tablet: Last Dose Taken:  , 1 tab(s) orally once a day  · 	carbidopa-levodopa 25 mg-250 mg oral tablet: Last Dose Taken:  , 1 tab(s) orally 5 times a day  · 	losartan 25 mg oral tablet: Last Dose Taken:  , 1 tab(s) orally once a day  · 	mirtazapine 15 mg oral tablet: Last Dose Taken:  , 1 tab(s) orally once a day (at bedtime)  · 	tamsulosin 0.4 mg oral capsule: Last Dose Taken:  , 1 cap(s) orally once a day (at bedtime)  · 	Crestor 20 mg oral tablet: Last Dose Taken:  , 1 tab(s) orally once a day (at bedtime)  · 	dapagliflozin-metformin 5 mg-1000 mg oral tablet, extended release: Last Dose Taken:  , 1 tab(s) orally once a day  · 	Veronica-Vit (vitamin B complex): Last Dose Taken:    Family history: No pertinent family history of renal disease or electrolyte disorder in first degree relatives.  Social history: Denies tobacco, alcohol, drug abuse.     Vital Signs Last 24 Hrs  T(C): 36.7 (05 Jun 2025 19:40), Max: 36.7 (05 Jun 2025 15:36)  T(F): 98.1 (05 Jun 2025 19:40), Max: 98.1 (05 Jun 2025 19:40)  HR: 66 (05 Jun 2025 19:40) (57 - 66)  BP: 119/64 (05 Jun 2025 19:40) (108/62 - 159/77)  RR: 17 (05 Jun 2025 19:40) (17 - 18)  SpO2: 96% (05 Jun 2025 19:40) (96% - 99%)  Parameters below as of 05 Jun 2025 19:40  Patient On (Oxygen Delivery Method): room air    Physical Exam:  Gen: no acute distress  MS: alert, conversing normally  HENT: NCAT, MMM  CV: S1-S2 normal, no LE edema  Chest: CTAB, no w/r/r,  Abd: soft, NT, ND  Skin: dry, warm, no rash or jaundice    06-05    137  |  108  |  50.8[H]  ----------------------------<  89  5.1   |  19.0[L]  |  1.96[H]    Ca    8.7      05 Jun 2025 04:30  Phos  3.8     06-05  Mg     2.3     06-05    TPro  6.4[L]  /  Alb  3.7  /  TBili  0.3[L]  /  DBili  x   /  AST  18  /  ALT  <5  /  AlkPhos  81  06-05                        9.3    8.43  )-----------( 283      ( 05 Jun 2025 04:30 )             29.2     Imaging reviewed  CT abdomen pelvis with no renal stone or hydronephrosis, bladder within normal limits

## 2025-06-05 NOTE — PATIENT PROFILE ADULT - FALL HARM RISK - HARM RISK INTERVENTIONS

## 2025-06-06 ENCOUNTER — TRANSCRIPTION ENCOUNTER (OUTPATIENT)
Age: 75
End: 2025-06-06

## 2025-06-06 VITALS
OXYGEN SATURATION: 97 % | TEMPERATURE: 99 F | SYSTOLIC BLOOD PRESSURE: 106 MMHG | HEART RATE: 70 BPM | RESPIRATION RATE: 16 BRPM | DIASTOLIC BLOOD PRESSURE: 58 MMHG

## 2025-06-06 LAB
ALBUMIN SERPL ELPH-MCNC: 3.7 G/DL — SIGNIFICANT CHANGE UP (ref 3.3–5.2)
ALP SERPL-CCNC: 92 U/L — SIGNIFICANT CHANGE UP (ref 40–120)
ALT FLD-CCNC: <5 U/L — SIGNIFICANT CHANGE UP
ANION GAP SERPL CALC-SCNC: 16 MMOL/L — SIGNIFICANT CHANGE UP (ref 5–17)
AST SERPL-CCNC: 13 U/L — SIGNIFICANT CHANGE UP
BASOPHILS # BLD AUTO: 0.03 K/UL — SIGNIFICANT CHANGE UP (ref 0–0.2)
BASOPHILS NFR BLD AUTO: 0.3 % — SIGNIFICANT CHANGE UP (ref 0–2)
BILIRUB SERPL-MCNC: <0.2 MG/DL — LOW (ref 0.4–2)
BUN SERPL-MCNC: 38 MG/DL — HIGH (ref 8–20)
CALCIUM SERPL-MCNC: 9.6 MG/DL — SIGNIFICANT CHANGE UP (ref 8.4–10.5)
CHLORIDE SERPL-SCNC: 106 MMOL/L — SIGNIFICANT CHANGE UP (ref 96–108)
CK SERPL-CCNC: 146 U/L — SIGNIFICANT CHANGE UP (ref 30–200)
CO2 SERPL-SCNC: 18 MMOL/L — LOW (ref 22–29)
CREAT SERPL-MCNC: 1.47 MG/DL — HIGH (ref 0.5–1.3)
EGFR: 49 ML/MIN/1.73M2 — LOW
EGFR: 49 ML/MIN/1.73M2 — LOW
EOSINOPHIL # BLD AUTO: 0.27 K/UL — SIGNIFICANT CHANGE UP (ref 0–0.5)
EOSINOPHIL NFR BLD AUTO: 3.1 % — SIGNIFICANT CHANGE UP (ref 0–6)
GLUCOSE BLDC GLUCOMTR-MCNC: 123 MG/DL — HIGH (ref 70–99)
GLUCOSE BLDC GLUCOMTR-MCNC: 154 MG/DL — HIGH (ref 70–99)
GLUCOSE SERPL-MCNC: 126 MG/DL — HIGH (ref 70–99)
HCT VFR BLD CALC: 32.4 % — LOW (ref 39–50)
HGB BLD-MCNC: 10.6 G/DL — LOW (ref 13–17)
IMM GRANULOCYTES # BLD AUTO: 0.04 K/UL — SIGNIFICANT CHANGE UP (ref 0–0.07)
IMM GRANULOCYTES NFR BLD AUTO: 0.5 % — SIGNIFICANT CHANGE UP (ref 0–0.9)
LYMPHOCYTES # BLD AUTO: 1.28 K/UL — SIGNIFICANT CHANGE UP (ref 1–3.3)
LYMPHOCYTES NFR BLD AUTO: 14.6 % — SIGNIFICANT CHANGE UP (ref 13–44)
MCHC RBC-ENTMCNC: 29.3 PG — SIGNIFICANT CHANGE UP (ref 27–34)
MCHC RBC-ENTMCNC: 32.7 G/DL — SIGNIFICANT CHANGE UP (ref 32–36)
MCV RBC AUTO: 89.5 FL — SIGNIFICANT CHANGE UP (ref 80–100)
MONOCYTES # BLD AUTO: 1.05 K/UL — HIGH (ref 0–0.9)
MONOCYTES NFR BLD AUTO: 12 % — SIGNIFICANT CHANGE UP (ref 2–14)
MRSA PCR RESULT.: SIGNIFICANT CHANGE UP
NEUTROPHILS # BLD AUTO: 6.07 K/UL — SIGNIFICANT CHANGE UP (ref 1.8–7.4)
NEUTROPHILS NFR BLD AUTO: 69.5 % — SIGNIFICANT CHANGE UP (ref 43–77)
NRBC # BLD AUTO: 0 K/UL — SIGNIFICANT CHANGE UP (ref 0–0)
NRBC # FLD: 0 K/UL — SIGNIFICANT CHANGE UP (ref 0–0)
NRBC BLD AUTO-RTO: 0 /100 WBCS — SIGNIFICANT CHANGE UP (ref 0–0)
PLATELET # BLD AUTO: 303 K/UL — SIGNIFICANT CHANGE UP (ref 150–400)
PMV BLD: 8.9 FL — SIGNIFICANT CHANGE UP (ref 7–13)
POTASSIUM SERPL-MCNC: 5.1 MMOL/L — SIGNIFICANT CHANGE UP (ref 3.5–5.3)
POTASSIUM SERPL-SCNC: 5.1 MMOL/L — SIGNIFICANT CHANGE UP (ref 3.5–5.3)
PROT SERPL-MCNC: 6.9 G/DL — SIGNIFICANT CHANGE UP (ref 6.6–8.7)
RBC # BLD: 3.62 M/UL — LOW (ref 4.2–5.8)
RBC # FLD: 14.5 % — SIGNIFICANT CHANGE UP (ref 10.3–14.5)
S AUREUS DNA NOSE QL NAA+PROBE: SIGNIFICANT CHANGE UP
SODIUM SERPL-SCNC: 140 MMOL/L — SIGNIFICANT CHANGE UP (ref 135–145)
T4 FREE SERPL-MCNC: 1.2 NG/DL — SIGNIFICANT CHANGE UP (ref 0.9–1.8)
TSH SERPL-MCNC: 0.36 UIU/ML — SIGNIFICANT CHANGE UP (ref 0.27–4.2)
WBC # BLD: 8.74 K/UL — SIGNIFICANT CHANGE UP (ref 3.8–10.5)
WBC # FLD AUTO: 8.74 K/UL — SIGNIFICANT CHANGE UP (ref 3.8–10.5)

## 2025-06-06 PROCEDURE — 83605 ASSAY OF LACTIC ACID: CPT

## 2025-06-06 PROCEDURE — 74176 CT ABD & PELVIS W/O CONTRAST: CPT

## 2025-06-06 PROCEDURE — 84156 ASSAY OF PROTEIN URINE: CPT

## 2025-06-06 PROCEDURE — 97163 PT EVAL HIGH COMPLEX 45 MIN: CPT

## 2025-06-06 PROCEDURE — 99285 EMERGENCY DEPT VISIT HI MDM: CPT

## 2025-06-06 PROCEDURE — 84550 ASSAY OF BLOOD/URIC ACID: CPT

## 2025-06-06 PROCEDURE — 85027 COMPLETE CBC AUTOMATED: CPT

## 2025-06-06 PROCEDURE — 84443 ASSAY THYROID STIM HORMONE: CPT

## 2025-06-06 PROCEDURE — 99232 SBSQ HOSP IP/OBS MODERATE 35: CPT

## 2025-06-06 PROCEDURE — 85025 COMPLETE CBC W/AUTO DIFF WBC: CPT

## 2025-06-06 PROCEDURE — 80048 BASIC METABOLIC PNL TOTAL CA: CPT

## 2025-06-06 PROCEDURE — 36415 COLL VENOUS BLD VENIPUNCTURE: CPT

## 2025-06-06 PROCEDURE — 84484 ASSAY OF TROPONIN QUANT: CPT

## 2025-06-06 PROCEDURE — 85610 PROTHROMBIN TIME: CPT

## 2025-06-06 PROCEDURE — 82550 ASSAY OF CK (CPK): CPT

## 2025-06-06 PROCEDURE — 83036 HEMOGLOBIN GLYCOSYLATED A1C: CPT

## 2025-06-06 PROCEDURE — 84439 ASSAY OF FREE THYROXINE: CPT

## 2025-06-06 PROCEDURE — 70450 CT HEAD/BRAIN W/O DYE: CPT

## 2025-06-06 PROCEDURE — 86901 BLOOD TYPING SEROLOGIC RH(D): CPT

## 2025-06-06 PROCEDURE — 85730 THROMBOPLASTIN TIME PARTIAL: CPT

## 2025-06-06 PROCEDURE — 82553 CREATINE MB FRACTION: CPT

## 2025-06-06 PROCEDURE — 84100 ASSAY OF PHOSPHORUS: CPT

## 2025-06-06 PROCEDURE — 80053 COMPREHEN METABOLIC PANEL: CPT

## 2025-06-06 PROCEDURE — 86850 RBC ANTIBODY SCREEN: CPT

## 2025-06-06 PROCEDURE — 87640 STAPH A DNA AMP PROBE: CPT

## 2025-06-06 PROCEDURE — 96375 TX/PRO/DX INJ NEW DRUG ADDON: CPT

## 2025-06-06 PROCEDURE — 73552 X-RAY EXAM OF FEMUR 2/>: CPT

## 2025-06-06 PROCEDURE — 82009 KETONE BODYS QUAL: CPT

## 2025-06-06 PROCEDURE — 83930 ASSAY OF BLOOD OSMOLALITY: CPT

## 2025-06-06 PROCEDURE — 93005 ELECTROCARDIOGRAM TRACING: CPT

## 2025-06-06 PROCEDURE — 81001 URINALYSIS AUTO W/SCOPE: CPT

## 2025-06-06 PROCEDURE — 96376 TX/PRO/DX INJ SAME DRUG ADON: CPT

## 2025-06-06 PROCEDURE — 99239 HOSP IP/OBS DSCHRG MGMT >30: CPT

## 2025-06-06 PROCEDURE — 71250 CT THORAX DX C-: CPT

## 2025-06-06 PROCEDURE — 96374 THER/PROPH/DIAG INJ IV PUSH: CPT

## 2025-06-06 PROCEDURE — 86900 BLOOD TYPING SEROLOGIC ABO: CPT

## 2025-06-06 PROCEDURE — 72125 CT NECK SPINE W/O DYE: CPT

## 2025-06-06 PROCEDURE — 87641 MR-STAPH DNA AMP PROBE: CPT

## 2025-06-06 PROCEDURE — 83735 ASSAY OF MAGNESIUM: CPT

## 2025-06-06 PROCEDURE — 73502 X-RAY EXAM HIP UNI 2-3 VIEWS: CPT

## 2025-06-06 PROCEDURE — 82570 ASSAY OF URINE CREATININE: CPT

## 2025-06-06 PROCEDURE — 82962 GLUCOSE BLOOD TEST: CPT

## 2025-06-06 PROCEDURE — 83935 ASSAY OF URINE OSMOLALITY: CPT

## 2025-06-06 PROCEDURE — 71045 X-RAY EXAM CHEST 1 VIEW: CPT

## 2025-06-06 RX ADMIN — TICAGRELOR 90 MILLIGRAM(S): 90 TABLET ORAL at 06:18

## 2025-06-06 RX ADMIN — LABETALOL HYDROCHLORIDE 100 MILLIGRAM(S): 200 TABLET, FILM COATED ORAL at 06:16

## 2025-06-06 RX ADMIN — Medication 81 MILLIGRAM(S): at 11:45

## 2025-06-06 RX ADMIN — MIDODRINE HYDROCHLORIDE 10 MILLIGRAM(S): 5 TABLET ORAL at 11:45

## 2025-06-06 RX ADMIN — GABAPENTIN 200 MILLIGRAM(S): 400 CAPSULE ORAL at 13:04

## 2025-06-06 RX ADMIN — HEPARIN SODIUM 5000 UNIT(S): 1000 INJECTION INTRAVENOUS; SUBCUTANEOUS at 06:16

## 2025-06-06 RX ADMIN — Medication 1 TABLET(S): at 06:17

## 2025-06-06 RX ADMIN — Medication 1 TABLET(S): at 10:00

## 2025-06-06 RX ADMIN — FINASTERIDE 5 MILLIGRAM(S): 1 TABLET, FILM COATED ORAL at 11:45

## 2025-06-06 RX ADMIN — GABAPENTIN 200 MILLIGRAM(S): 400 CAPSULE ORAL at 06:17

## 2025-06-06 RX ADMIN — MIDODRINE HYDROCHLORIDE 10 MILLIGRAM(S): 5 TABLET ORAL at 06:17

## 2025-06-06 RX ADMIN — Medication 40 MILLIGRAM(S): at 06:18

## 2025-06-06 RX ADMIN — Medication 1 TABLET(S): at 13:04

## 2025-06-06 RX ADMIN — INSULIN LISPRO 1: 100 INJECTION, SOLUTION INTRAVENOUS; SUBCUTANEOUS at 09:05

## 2025-06-06 NOTE — DISCHARGE NOTE NURSING/CASE MANAGEMENT/SOCIAL WORK - FINANCIAL ASSISTANCE
[facility] provides services at a reduced cost to those who are determined to be eligible through [facility]’s financial assistance program. Information regarding [facility]’s financial assistance program can be found by going to [email or web address] or by calling [phone].

## 2025-06-06 NOTE — DISCHARGE NOTE PROVIDER - PREFACE STATEMENT FOR MINUTES SPENT
· Hgb 7.3 on admission  · Repeat labs obtained in the ED show Hgb 6.3 although suspect this is dilutional, follow up repeat H+H   · Prior baseline Hgb 9-10 in January 2023  · Iron study consistent with REN   · Will obtain vitamin B12 and folate as well   · Patient unsure regarding melena/hematochezia   · Hold off on IV Venofer given concern for acute infection/sepsis ; will start PO supplement   · Per ED, rectal exam hemoccult negative  · Will obtain FIT   · Hold ASA, Plavix, DVT ppx for now    · Transfuse for Hgb < 7   · Blood transfusion consent obtained I personally spent

## 2025-06-06 NOTE — PROGRESS NOTE ADULT - ASSESSMENT
73 y/o M w/ PMH of Parkinson's dx, dyskinesia, orthostatic hypotension (on midodrine), DM type II, BPH. ?prostate CA, s/p spinal cord stimulator implant, CAD s/p PCI w/ IRINA x3 4/3/25 w/ Dr. MAGDALENO Jimenez (on ASA and brilinta), CKD s/p renal failure 9/2024 requiring course of HD w/ renal improvement and now off HD, ambulates w/ walker at baseline, presented from home after a mechanical fall while walking in his bedroom yesterday and AMS.  Pt at this time is AAOx3 and able to remember event clearly but is unsure how long he was down for.  VS WNL.  Clinically hypovolemic but mentation near baseline.   Initial w/u significant for WBC 12K, K6.8-->5.4, HCO3 15-->19, AG22-->17, BUN52/2.54-->55/2.42.  CTH and Cspine w/ no acute findings.  CT c/a/p w/ no tramatic injury within chest, abd or pelvis, no kidney stones or hydronephrosis.  s/p 1L NS bolus, 4u insulin/dextrose cocktail, 1amp NaHCO3, 10g lokelma, 2g calcium gluc, 0.5mg IV ativan, 2mg IV morphine x2 in ED.  Admit for acute renal failure.      Acute metabolic encephalopathy likely 2/2 acute renal failure   Acute renal failure on CKD, possibly multifactorial from dehydration and  r/o metformin induced ARF  Acute metabolic acidosis  Hyperkalemia   -Tele  - Mentation near baseline at this time   - Cr 1.9 . Last known Cr 1.24 on 3/27/25 but on initial w/u was 2.54   - K improve. co2 19  - s/p 4u insulin/dextrose 1amp NaHCO3, 10g lokelma, 2g calcium gluc and 1L NS bolus  - hx of acute renal failure requiring HD 09/2024 w/ improved renal function and now off HD   - CT c/a/p: kidneys without stones or hydronephrosis, prostate enlarged and protrudes into post wall of urinary bladder   -  lactate level stable   - Hold ARB given hyperkalemia and ARF   - Monitor strict I/O's, renal function and lytes   - Bladder scan q6h,i/o  - Nephrology consulted ([facility])                Fall likely related to parkinson's w/ autonomic dysregulation    Mild rhabdo,  - Pt reports it was a mechanical fall and had no near syncopal prodrome prior to fall   - CTH and Cspine w/ no acute findings   - CT c/a/p w/ no traumatic injury within chest, abd or pelvis  -CT L-spine L3-S1 disc disease  - Will check orthostats   - home on midodrine and carbidopa/levodopa  - c/w mirtazapine   - Fall and aspiration precautions   - Creatine Kinase: 214 U/L  - PT consulted         Chronic normocytic anemia  - Slight down trend in H/H from 11.3-->10.1 -->9.3 since 03/2025  - Is on DAPT and will place on PPI while inpt given also placing on heparin sq but only BID for VTE ppx   - Hemolysis considered given hyperkalemia but Tbili normal  - Will check iron panel and smear for schistocytes   - Monitor CBC and transfuse for Hb<8       CAD s/p PCI 4/3/25  HTN / HLD  - c/w uninterrupted ASA and brilinta given recent PCI  - Hold ARB given current renal function   - c/w BB and statin       DM type II  - f/u A1cA1C with Estimated Average Glucose Result: 6.9 %  - Hold farxiga and metformin   - ISS and hypoglycemic protocol in place       BPH w/ ?prostate CA  - c/w tamsulosin and finasteride   - Caution w/ bph meds as can precipitate orthostasis   - CT shows Radiation seeds in the prostate gland which would be used for prostate CA      Chronic pain / Dyskinesias  - Confirmed by admitting physician  Xanax 0.25mg qhs and oxycodone 5mg BID prn on ISTOP (reference #: [id number])  - c/w cyclobenzaprine and gabapentin      Incidental CT findings   - Heterogeneous thyroid gland with enlarged left thyroid lobe and suspected underlying 3.5 cm nodule  -check tsh,FT4  - Nonemergent outpt US recommended to further evaluate        VTE ppx: heparin sq  gi ppx : PPI    Dispo: Acute. pending improvement of renal function    Spoke with nephrology -will f/u .   73 y/o M w/ PMH of Parkinson's dx, dyskinesia, orthostatic hypotension (on midodrine), DM type II, BPH. ?prostate CA, s/p spinal cord stimulator implant, CAD s/p PCI w/ IRINA x3 4/3/25 w/ Dr. MAGDALENO Jimenez (on ASA and brilinta), CKD s/p renal failure 9/2024 requiring course of HD w/ renal improvement and now off HD, ambulates w/ walker at baseline, presented from home after a mechanical fall while walking in his bedroom yesterday and AMS.  Pt at this time is AAOx3 and able to remember event clearly but is unsure how long he was down for.  VS WNL.  Clinically hypovolemic but mentation near baseline.   Initial w/u significant for WBC 12K, K6.8-->5.4, HCO3 15-->19, AG22-->17, BUN52/2.54-->55/2.42.  CTH and Cspine w/ no acute findings.  CT c/a/p w/ no tramatic injury within chest, abd or pelvis, no kidney stones or hydronephrosis.  s/p 1L NS bolus, 4u insulin/dextrose cocktail, 1amp NaHCO3, 10g lokelma, 2g calcium gluc, 0.5mg IV ativan, 2mg IV morphine x2 in ED.  Admit for acute renal failure.      Acute metabolic encephalopathy likely 2/2 acute renal failure   Acute renal failure on CKD, possibly multifactorial from dehydration and  r/o metformin induced ARF  Acute metabolic acidosis  Hyperkalemia   -Tele  - Mentation near baseline at this time   - Cr trended down.   - K improve. co2 19  - s/p 4u insulin/dextrose 1amp NaHCO3, 10g lokelma, 2g calcium gluc and 1L NS bolus  - hx of acute renal failure requiring HD 09/2024 w/ improved renal function and now off HD   - CT c/a/p: kidneys without stones or hydronephrosis, prostate enlarged and protrudes into post wall of urinary bladder   -  lactate level stable   - Hold ARB given hyperkalemia and ARF   - Monitor strict I/O's, renal function and lytes   - Bladder scan q6h,i/o  - Nephrology consulted ([facility]), recs noted - ok w/ d/c and close f/u next week    #Fall likely related to parkinson's w/ autonomic dysregulation    Mild rhabdo,  - Pt reports it was a mechanical fall and had no near syncopal prodrome prior to fall   - CTH and Cspine w/ no acute findings   - CT c/a/p w/ no traumatic injury within chest, abd or pelvis  -CT L-spine L3-S1 disc disease  - Will check orthostats   - home on midodrine and carbidopa/levodopa  - c/w mirtazapine   - Fall and aspiration precautions   - Creatine Kinase: 214 U/L  - PT consulted     #Chronic normocytic anemia  - Slight down trend in H/H from 11.3-->10.1 -->9.3 since 03/2025  - Is on DAPT and will place on PPI while inpt given also placing on heparin sq but only BID for VTE ppx   - Hemolysis considered given hyperkalemia but Tbili normal  - Monitor CBC and transfuse for Hb<8     #CAD s/p PCI 4/3/25  HTN / HLD  - c/w uninterrupted ASA and brilinta given recent PCI  - Hold ARB given current renal function   - c/w BB and statin     #DM type II  - A1cA1C with Estimated Average Glucose Result: 6.9 %  - Hold farxiga and metformin   - ISS and hypoglycemic protocol in place     #BPH w/ ?prostate CA  - c/w tamsulosin and finasteride   - Caution w/ bph meds as can precipitate orthostasis   - CT shows Radiation seeds in the prostate gland which would be used for prostate CA    #Chronic pain / Dyskinesias  - Confirmed by admitting physician  Xanax 0.25mg qhs and oxycodone 5mg BID prn on ISTOP (reference #: [id number])  - c/w cyclobenzaprine and gabapentin    #Incidental CT findings   - Heterogeneous thyroid gland with enlarged left thyroid lobe and suspected underlying 3.5 cm nodule  - tsh initially low but repeat was WNL  - Nonemergent outpt US recommended to further evaluate        VTE ppx: heparin sq  gi ppx : PPI    Dispo: d/c home w/ home PT today    Spoke with nephrology   discussed w/ SW/CM

## 2025-06-06 NOTE — PROGRESS NOTE ADULT - SUBJECTIVE AND OBJECTIVE BOX
[facility] Division of Hospital Medicine    SUBJECTIVE / OVERNIGHT EVENTS:    Patient denies chest pain, SOB, abd pain, N/V, fever, chills, dysuria or any other complaints. All remainder ROS negative.     MEDICATIONS  (STANDING):  aspirin  chewable 81 milliGRAM(s) Oral daily  carbidopa/levodopa  25/100 1 Tablet(s) Oral <User Schedule>  chlorhexidine 2% Cloths 1 Application(s) Topical <User Schedule>  cyclobenzaprine 5 milliGRAM(s) Oral at bedtime  dextrose 5%. 1000 milliLiter(s) (50 mL/Hr) IV Continuous <Continuous>  dextrose 5%. 1000 milliLiter(s) (100 mL/Hr) IV Continuous <Continuous>  dextrose 50% Injectable 25 Gram(s) IV Push once  dextrose 50% Injectable 12.5 Gram(s) IV Push once  dextrose 50% Injectable 25 Gram(s) IV Push once  finasteride 5 milliGRAM(s) Oral daily  gabapentin 200 milliGRAM(s) Oral three times a day  glucagon  Injectable 1 milliGRAM(s) IntraMuscular once  heparin   Injectable 5000 Unit(s) SubCutaneous every 12 hours  insulin lispro (ADMELOG) corrective regimen sliding scale   SubCutaneous three times a day before meals  labetalol 100 milliGRAM(s) Oral two times a day  midodrine. 10 milliGRAM(s) Oral three times a day  mirtazapine 15 milliGRAM(s) Oral at bedtime  pantoprazole    Tablet 40 milliGRAM(s) Oral before breakfast  rosuvastatin 20 milliGRAM(s) Oral at bedtime  sodium chloride 0.9%. 1000 milliLiter(s) (70 mL/Hr) IV Continuous <Continuous>  tamsulosin 0.4 milliGRAM(s) Oral at bedtime  ticagrelor 90 milliGRAM(s) Oral every 12 hours    MEDICATIONS  (PRN):  acetaminophen     Tablet .. 650 milliGRAM(s) Oral every 6 hours PRN Temp greater or equal to 38C (100.4F), Mild Pain (1 - 3)  ALPRAZolam 0.25 milliGRAM(s) Oral at bedtime PRN anxiety / sleep  aluminum hydroxide/magnesium hydroxide/simethicone Suspension 30 milliLiter(s) Oral every 4 hours PRN Dyspepsia  dextrose Oral Gel 15 Gram(s) Oral once PRN Blood Glucose LESS THAN 70 milliGRAM(s)/deciliter  melatonin 3 milliGRAM(s) Oral at bedtime PRN Insomnia  ondansetron Injectable 4 milliGRAM(s) IV Push every 8 hours PRN Nausea and/or Vomiting  oxyCODONE    IR 5 milliGRAM(s) Oral two times a day PRN Moderate Pain (4 - 6)        I&O's Summary    05 Jun 2025 07:01  -  06 Jun 2025 07:00  --------------------------------------------------------  IN: 380 mL / OUT: 603 mL / NET: -223 mL    06 Jun 2025 07:01  -  06 Jun 2025 13:55  --------------------------------------------------------  IN: 240 mL / OUT: 450 mL / NET: -210 mL        PHYSICAL EXAM:  Vital Signs Last 24 Hrs  T(C): 36.8 (06 Jun 2025 08:02), Max: 36.8 (06 Jun 2025 08:02)  T(F): 98.2 (06 Jun 2025 08:02), Max: 98.2 (06 Jun 2025 08:02)  HR: 66 (06 Jun 2025 11:45) (57 - 75)  BP: 124/71 (06 Jun 2025 11:45) (117/63 - 159/77)  BP(mean): --  RR: 16 (06 Jun 2025 08:02) (16 - 18)  SpO2: 97% (06 Jun 2025 08:02) (94% - 98%)    Parameters below as of 06 Jun 2025 08:02  Patient On (Oxygen Delivery Method): room air          General: Age-appearing, in no acute distress  Head: Normocephalic, atraumatic  ENMT: EOMI, no scleral icterus, neck supple, no JVD  Cardiovascular: +S1, S2; Regular rate and rhythm, no murmurs.  Respiratory: CTAB, no wheezing, no rales, no rhonchi  Gastrointestinal: soft, ND, NT, (+) BS, (-) rebound  Neuro: AAOx3, CN2-12 intact, no FND  Musculoskeletal: Normal tone, no deformities  Skin: Warm, dry, no rash, no jaundice  Psych: Calm, cooperative     LABS:                        10.6   8.74  )-----------( 303      ( 06 Jun 2025 04:40 )             32.4     06-06    140  |  106  |  38.0[H]  ----------------------------<  126[H]  5.1   |  18.0[L]  |  1.47[H]    Ca    9.6      06 Jun 2025 04:40  Phos  3.8     06-05  Mg     2.3     06-05    TPro  6.9  /  Alb  3.7  /  TBili  <0.2[L]  /  DBili  x   /  AST  13  /  ALT  <5  /  AlkPhos  92  06-06    PT/INR - ( 04 Jun 2025 16:22 )   PT: 12.1 sec;   INR: 1.07 ratio         PTT - ( 04 Jun 2025 16:22 )  PTT:32.3 sec  CARDIAC MARKERS ( 05 Jun 2025 04:30 )  x     / x     / x     / x     / 5.6 ng/mL  CARDIAC MARKERS ( 04 Jun 2025 16:22 )  x     / x     / x     / x     / 8.3 ng/mL      Urinalysis Basic - ( 06 Jun 2025 04:40 )    Color: x / Appearance: x / SG: x / pH: x  Gluc: 126 mg/dL / Ketone: x  / Bili: x / Urobili: x   Blood: x / Protein: x / Nitrite: x   Leuk Esterase: x / RBC: x / WBC x   Sq Epi: x / Non Sq Epi: x / Bacteria: x        CAPILLARY BLOOD GLUCOSE      POCT Blood Glucose.: 123 mg/dL (06 Jun 2025 12:07)  POCT Blood Glucose.: 154 mg/dL (06 Jun 2025 08:49)  POCT Blood Glucose.: 173 mg/dL (05 Jun 2025 17:07)        RADIOLOGY & ADDITIONAL TESTS:  Results Reviewed:   Imaging Personally Reviewed:  Electrocardiogram Personally Reviewed:

## 2025-06-06 NOTE — DISCHARGE NOTE PROVIDER - NSDCMRMEDTOKEN_GEN_ALL_CORE_FT
aspirin 81 mg oral tablet: 1 orally once a day  Brilinta (ticagrelor) 90 mg oral tablet: 1 tab(s) orally 2 times a day  carbidopa-levodopa 25 mg-250 mg oral tablet: 1 tab(s) orally 5 times a day  Crestor 20 mg oral tablet: 1 tab(s) orally once a day (at bedtime)  cyclobenzaprine 5 mg oral tablet: 1 tab(s) orally once a day (at bedtime)  dapagliflozin-metformin 5 mg-1000 mg oral tablet, extended release: 1 tab(s) orally once a day  finasteride 5 mg oral tablet: 1 tab(s) orally once a day  gabapentin 100 mg oral capsule: 2 cap(s) orally 3 times a day  labetalol 100 mg oral tablet: 1 tab(s) orally 2 times a day  midodrine 10 mg oral tablet: 1 tab(s) orally 3 times a day  mirtazapine 15 mg oral tablet: 1 tab(s) orally once a day (at bedtime)  oxyCODONE 5 mg oral tablet: 1 tab(s) orally 2 times a day as needed for pain  Veronica-Vit (vitamin B complex):   tamsulosin 0.4 mg oral capsule: 1 cap(s) orally once a day (at bedtime)  traZODone 100 mg oral tablet: 1 tab(s) orally once a day (at bedtime) as needed for  insomnia  Xanax 0.25 mg oral tablet: 1 tab(s) orally prn

## 2025-06-06 NOTE — DISCHARGE NOTE PROVIDER - CARE PROVIDER_API CALL
Stanley Kitchen  Nephrology  [address] [place] [id number]  Phone: [phone]  Fax: [phone]  Established Patient  Follow Up Time: 1-3 days

## 2025-06-06 NOTE — DISCHARGE NOTE PROVIDER - NSDCCPCAREPLAN_GEN_ALL_CORE_FT
PRINCIPAL DISCHARGE DIAGNOSIS  Diagnosis: VALARIE (acute kidney injury)  Assessment and Plan of Treatment: Plan:  Please do not take Losartan until ok'd by Nephrologist  Please followup with Nephrology early next week.

## 2025-06-06 NOTE — PROGRESS NOTE ADULT - TIME BILLING
Time spent reviewing the chart documentation, reviewing labs and imaging studies, evaluating the patient, clinical exam, discussing the plan of care with the medical team and/or all necessary consultants, and documenting.
Time spent reviewing the chart documentation, reviewing labs and imaging studies, evaluating the patient, discussing the plan of care with the medical team, and documenting.

## 2025-06-06 NOTE — PROGRESS NOTE ADULT - SUBJECTIVE AND OBJECTIVE BOX
Reason for visit: VALARIE  Subjective: feeling better, no urinary complains.  Review of systems: All systems were reviewed in detail, pertinent positive and negative have been mentioned above, otherwise negative.    Physical Exam:  Gen: no acute distress  MS: alert, conversing normally  HENT: NCAT, MMM  CV: S1-S2 normal, no LE edema  Chest: CTAB, no w/r/r,  Abd: soft, NT, ND  Skin: dry, warm, no rash or jaundice    Vital Signs Last 24 Hrs  T(C): 37 (06 Jun 2025 15:05), Max: 37 (06 Jun 2025 15:05)  T(F): 98.6 (06 Jun 2025 15:05), Max: 98.6 (06 Jun 2025 15:05)  HR: 70 (06 Jun 2025 15:05) (64 - 75)  BP: 106/58 (06 Jun 2025 15:05) (106/58 - 124/71)  BP(mean): --  RR: 16 (06 Jun 2025 15:05) (16 - 18)  SpO2: 97% (06 Jun 2025 15:05) (97% - 98%)    Parameters below as of 06 Jun 2025 15:05  Patient On (Oxygen Delivery Method): room air      I&O's Summary    05 Jun 2025 07:01  -  06 Jun 2025 07:00  --------------------------------------------------------  IN: 380 mL / OUT: 603 mL / NET: -223 mL    06 Jun 2025 07:01  -  06 Jun 2025 22:13  --------------------------------------------------------  IN: 240 mL / OUT: 450 mL / NET: -210 mL      Current Antibiotics:    Other medications:  aspirin  chewable 81 milliGRAM(s) Oral daily  carbidopa/levodopa  25/100 1 Tablet(s) Oral <User Schedule>  chlorhexidine 2% Cloths 1 Application(s) Topical <User Schedule>  cyclobenzaprine 5 milliGRAM(s) Oral at bedtime  dextrose 5%. 1000 milliLiter(s) IV Continuous <Continuous>  dextrose 5%. 1000 milliLiter(s) IV Continuous <Continuous>  dextrose 50% Injectable 25 Gram(s) IV Push once  dextrose 50% Injectable 12.5 Gram(s) IV Push once  dextrose 50% Injectable 25 Gram(s) IV Push once  finasteride 5 milliGRAM(s) Oral daily  gabapentin 200 milliGRAM(s) Oral three times a day  glucagon  Injectable 1 milliGRAM(s) IntraMuscular once  heparin   Injectable 5000 Unit(s) SubCutaneous every 12 hours  insulin lispro (ADMELOG) corrective regimen sliding scale   SubCutaneous three times a day before meals  labetalol 100 milliGRAM(s) Oral two times a day  midodrine. 10 milliGRAM(s) Oral three times a day  mirtazapine 15 milliGRAM(s) Oral at bedtime  pantoprazole    Tablet 40 milliGRAM(s) Oral before breakfast  rosuvastatin 20 milliGRAM(s) Oral at bedtime  sodium chloride 0.9%. 1000 milliLiter(s) IV Continuous <Continuous>  tamsulosin 0.4 milliGRAM(s) Oral at bedtime  ticagrelor 90 milliGRAM(s) Oral every 12 hours    06-06    140  |  106  |  38.0[H]  ----------------------------<  126[H]  5.1   |  18.0[L]  |  1.47[H]    Ca    9.6      06 Jun 2025 04:40  Phos  3.8     06-05  Mg     2.3     06-05    TPro  6.9  /  Alb  3.7  /  TBili  <0.2[L]  /  DBili  x   /  AST  13  /  ALT  <5  /  AlkPhos  92  06-06    Creatinine: 1.47 mg/dL (06-06-25 @ 04:40)  Creatinine: 1.96 mg/dL (06-05-25 @ 04:30)  Creatinine: 2.03 mg/dL (06-05-25 @ 03:10)  Creatinine: 2.42 mg/dL (06-04-25 @ 22:53)  Creatinine: 2.54 mg/dL (06-04-25 @ 16:22)                          10.6   8.74  )-----------( 303      ( 06 Jun 2025 04:40 )             32.4

## 2025-06-06 NOTE — DISCHARGE NOTE PROVIDER - HOSPITAL COURSE
73 y/o M w/ PMH of Parkinson's dx, dyskinesia, orthostatic hypotension (on midodrine), DM type II, BPH. ?prostate CA, s/p spinal cord stimulator implant, CAD s/p PCI w/ IRINA x3 4/3/25 w/ Dr. MAGDALENO Jimenez (on ASA and brilinta), CKD s/p renal failure 9/2024 requiring course of HD w/ renal improvement and now off HD, ambulates w/ walker at baseline, presented from home after a mechanical fall while walking in his bedroom yesterday and AMS.  Pt at this time is AAOx3 and able to remember event clearly but is unsure how long he was down for.  VS WNL.  Clinically hypovolemic but mentation near baseline.   Initial w/u significant for WBC 12K, K6.8-->5.4, HCO3 15-->19, AG22-->17, BUN52/2.54-->55/2.42.  CTH and Cspine w/ no acute findings.  CT c/a/p w/ no tramatic injury within chest, abd or pelvis, no kidney stones or hydronephrosis.  s/p 1L NS bolus, 4u insulin/dextrose cocktail, 1amp NaHCO3, 10g lokelma, 2g calcium gluc, 0.5mg IV ativan, 2mg IV morphine x2 in ED.  Admitted for acute renal failure. Nephrology was consulted and recommendations were followed. The patient was monitored serially, clinically improved, and ultimately deemed stable for discharge with instructions to f/u outpatient with Nephrology next week and PMD.

## 2025-06-06 NOTE — PROGRESS NOTE ADULT - TIME-BASED
[facility] DERMATOLOGY  [address] [place] [id number]  [phone]  [phone]     MRN: [id number] : 1947  Encounter: [id number]  Patient Information: Zaria Kimbrough    Subjective:     75-year-old female presents because concerns regarding lesion on the left chest wall that suddenly appeared last week and has been itching     Objective: There were no vitals taken for this visit  Physical Exam:    General Appearance:    Alert, cooperative, no distress   Skin:  Inflamed keratotic papule chest wall     Assessment:     1  Lichenoid keratosis           Plan:   Patient reassured this is just inflamed keratosis no treatment needed unless this does not resolve will plan follow-up again in May for overall checkup      Prior to Admission medications    Medication Sig Start Date End Date Taking?  Authorizing Provider   Ascorbic Acid (VITAMIN C) 100 MG tablet Take 1 tablet by mouth daily   Yes Historical Provider, MD   B Complex Vitamins (VITAMIN B COMPLEX) TABS Take 1 tablet by mouth daily   Yes Historical Provider, MD   cholecalciferol (VITAMIN D3) 1,000 units tablet Take 1 tablet by mouth daily   Yes Historical Provider, MD   Coenzyme Q10 (CO Q 10) 10 MG CAPS Take 1 capsule by mouth daily   Yes Historical Provider, MD   fluticasone (FLONASE) 50 mcg/act nasal spray  1/3/20  Yes Historical Provider, MD   Multiple Vitamins-Minerals (EYE VITAMINS & MINERALS PO) Take by mouth   Yes Historical Provider, MD   Multiple Vitamins-Minerals (MULTIVITAMIN ADULT PO) Take 1 tablet by mouth daily   Yes Historical Provider, MD   olmesartan (BENICAR) 40 mg tablet TAKE 1 TABLET BY MOUTH EVERY DAY 20  Yes Jason Swanson MD   Omega-3 Fatty Acids (FISH OIL PO)  3/28/11  Yes Historical Provider, MD   Red Yeast Rice 600 MG CAPS Take by mouth   Yes Historical Provider, MD   loratadine (CLARITIN) 10 mg tablet Take 10 mg by mouth daily 19   Historical Provider, MD   Omega-3
Fatty Acids (FISH OIL) 645 MG CAPS Take 1 capsule by mouth daily  2/25/20  Historical Provider, MD     Allergies   Allergen Reactions    Other      "Khmer food"    Seasonal Ic  [Cholestatin]        Manuel Rosales MD  2/25/2020,2:57 PM    Portions of the record may have been created with voice recognition software   Occasional wrong word or "sound a like" substitutions may have occurred due to the inherent limitations of voice recognition software   Read the chart carefully and recognize, using context, where substitutions have occurred 
38
52

## 2025-06-06 NOTE — DISCHARGE NOTE NURSING/CASE MANAGEMENT/SOCIAL WORK - PATIENT PORTAL LINK FT
You can access the FollowMyHealth Patient Portal offered by [facility] by registering at the following website: [email or web address]. By joining [facility]’s FollowMyHealth portal, you will also be able to view your health information using other applications (apps) compatible with our system.

## 2025-06-06 NOTE — PROGRESS NOTE ADULT - ASSESSMENT
74 YOM with PMH of Parkinson disease, dyskinesia, orthostatic hypotension, DM, BPH, CAD, CKD with history of requiring hemodialysis presented after mechanical fall and altered mental status.  Workup with leukocytosis, hyperkalemia, elevated serum creatinine.  Imaging with no acute findings.  Nephrology consulted for VALARIE.    VALARIE on CKD 3  ·	Baseline serum creatinine 1.24 on 3/27/2025  ·	Elevated to 2.54 on admission  ·	Patient has history of acute renal failure requiring hemodialysis 09/2024 with subsequent recovery  ·	UA bland except glucosuria  ·	Imaging with no hydronephrosis  ·	VALARIE likely combination prerenal; hypotension, volume depletion, concomitant ARB use  ·	Holding ARB, s/p gentle IVF  ·	Serum creatinine improving down to 1.47 today.    Orthostatic hypotension associated with parkinsonism: Placed on midodrine, holding antihypertensives.  Maintain MAP more than 65  BPH: Continue on tamsulosin and finasteride  Parkinsonism: Continue on carbidopa levodopa    Nephrology will sign off, please call back w/ questions/concerns.  D/w DR Kapoor, outpatient f/u i nephrology clinic.

## 2025-06-06 NOTE — DISCHARGE NOTE PROVIDER - NSDCFUSCHEDAPPT_GEN_ALL_CORE_FT
Mustapha Gutiérrez Physician [facility]  NEUROLOGY [address]  Scheduled Appointment: 07/21/2025

## 2025-06-27 ENCOUNTER — APPOINTMENT (OUTPATIENT)
Dept: NEPHROLOGY | Facility: CLINIC | Age: 75
End: 2025-06-27
Payer: MEDICARE

## 2025-06-27 ENCOUNTER — NON-APPOINTMENT (OUTPATIENT)
Age: 75
End: 2025-06-27

## 2025-06-27 VITALS
SYSTOLIC BLOOD PRESSURE: 94 MMHG | DIASTOLIC BLOOD PRESSURE: 56 MMHG | WEIGHT: 149 LBS | HEART RATE: 77 BPM | OXYGEN SATURATION: 98 % | BODY MASS INDEX: 19.66 KG/M2

## 2025-06-27 PROBLEM — N17.9 AKI (ACUTE KIDNEY INJURY): Status: ACTIVE | Noted: 2025-06-27

## 2025-06-27 PROBLEM — I10 HYPERTENSION, UNSPECIFIED TYPE: Status: ACTIVE | Noted: 2025-06-27

## 2025-06-27 PROCEDURE — 99214 OFFICE O/P EST MOD 30 MIN: CPT

## 2025-06-27 RX ORDER — LABETALOL HYDROCHLORIDE 100 MG/1
100 TABLET, FILM COATED ORAL
Refills: 0 | Status: ACTIVE | COMMUNITY

## 2025-06-27 RX ORDER — DAPAGLIFLOZIN AND METFORMIN HYDROCHLORIDE 5; 1000 MG/1; MG/1
5-1000 TABLET, FILM COATED, EXTENDED RELEASE ORAL DAILY
Refills: 0 | Status: ACTIVE | COMMUNITY

## 2025-06-27 RX ORDER — MIRTAZAPINE 15 MG/1
15 TABLET, FILM COATED ORAL
Refills: 0 | Status: ACTIVE | COMMUNITY

## 2025-06-27 RX ORDER — ROSUVASTATIN CALCIUM 20 MG/1
20 TABLET, FILM COATED ORAL DAILY
Refills: 0 | Status: ACTIVE | COMMUNITY

## 2025-06-27 RX ORDER — MIDODRINE HYDROCHLORIDE 10 MG/1
10 TABLET ORAL 3 TIMES DAILY
Refills: 0 | Status: ACTIVE | COMMUNITY

## 2025-06-27 RX ORDER — TRAZODONE HYDROCHLORIDE 100 MG/1
100 TABLET ORAL
Refills: 0 | Status: ACTIVE | COMMUNITY

## 2025-06-27 RX ORDER — FOLIC ACID/VIT B COMPLEX AND C 0.8 MG
TABLET ORAL DAILY
Refills: 0 | Status: ACTIVE | COMMUNITY

## 2025-06-27 RX ORDER — ALPRAZOLAM 0.25 MG/1
0.25 TABLET ORAL
Refills: 0 | Status: ACTIVE | COMMUNITY

## 2025-06-27 RX ORDER — TICAGRELOR 90 MG/1
90 TABLET ORAL TWICE DAILY
Refills: 0 | Status: ACTIVE | COMMUNITY

## 2025-06-27 RX ORDER — OXYCODONE 5 MG/1
5 TABLET ORAL TWICE DAILY
Refills: 0 | Status: ACTIVE | COMMUNITY

## 2025-06-30 LAB
HCT VFR BLD CALC: 33.5 %
HGB BLD-MCNC: 10.4 G/DL
MCHC RBC-ENTMCNC: 29.2 PG
MCHC RBC-ENTMCNC: 31 G/DL
MCV RBC AUTO: 94.1 FL
PLATELET # BLD AUTO: 341 K/UL
RBC # BLD: 3.56 M/UL
RBC # FLD: 14.4 %
WBC # FLD AUTO: 8.26 K/UL

## 2025-07-03 ENCOUNTER — EMERGENCY (EMERGENCY)
Facility: HOSPITAL | Age: 75
LOS: 1 days | End: 2025-07-03
Attending: STUDENT IN AN ORGANIZED HEALTH CARE EDUCATION/TRAINING PROGRAM
Payer: MEDICARE

## 2025-07-03 ENCOUNTER — NON-APPOINTMENT (OUTPATIENT)
Age: 75
End: 2025-07-03

## 2025-07-03 VITALS
DIASTOLIC BLOOD PRESSURE: 77 MMHG | HEART RATE: 63 BPM | OXYGEN SATURATION: 96 % | TEMPERATURE: 98 F | SYSTOLIC BLOOD PRESSURE: 138 MMHG | RESPIRATION RATE: 18 BRPM

## 2025-07-03 VITALS
RESPIRATION RATE: 20 BRPM | DIASTOLIC BLOOD PRESSURE: 70 MMHG | HEART RATE: 75 BPM | SYSTOLIC BLOOD PRESSURE: 118 MMHG | OXYGEN SATURATION: 98 % | WEIGHT: 160.06 LBS | TEMPERATURE: 99 F

## 2025-07-03 DIAGNOSIS — Z98.890 OTHER SPECIFIED POSTPROCEDURAL STATES: Chronic | ICD-10-CM

## 2025-07-03 DIAGNOSIS — Z96.89 PRESENCE OF OTHER SPECIFIED FUNCTIONAL IMPLANTS: Chronic | ICD-10-CM

## 2025-07-03 DIAGNOSIS — Z98.61 CORONARY ANGIOPLASTY STATUS: Chronic | ICD-10-CM

## 2025-07-03 LAB
ALBUMIN SERPL ELPH-MCNC: 4.2 G/DL
ANION GAP SERPL CALC-SCNC: 21 MMOL/L
APPEARANCE UR: CLEAR — SIGNIFICANT CHANGE UP
BACTERIA # UR AUTO: NEGATIVE /HPF — SIGNIFICANT CHANGE UP
BILIRUB UR-MCNC: NEGATIVE — SIGNIFICANT CHANGE UP
BUN SERPL-MCNC: 35 MG/DL
CALCIUM SERPL-MCNC: 9.4 MG/DL
CAST: 3 /LPF — SIGNIFICANT CHANGE UP (ref 0–4)
CHLORIDE SERPL-SCNC: 111 MMOL/L
CO2 SERPL-SCNC: 13 MMOL/L
COLOR SPEC: YELLOW — SIGNIFICANT CHANGE UP
CREAT SERPL-MCNC: 1.43 MG/DL
DIFF PNL FLD: NEGATIVE — SIGNIFICANT CHANGE UP
EGFRCR SERPLBLD CKD-EPI 2021: 51 ML/MIN/1.73M2
GLUCOSE SERPL-MCNC: 144 MG/DL
GLUCOSE UR QL: >=1000 MG/DL
KETONES UR QL: ABNORMAL MG/DL
LEUKOCYTE ESTERASE UR-ACNC: NEGATIVE — SIGNIFICANT CHANGE UP
NITRITE UR-MCNC: NEGATIVE — SIGNIFICANT CHANGE UP
PH UR: 5.5 — SIGNIFICANT CHANGE UP (ref 5–8)
PHOSPHATE SERPL-MCNC: 3.6 MG/DL
POTASSIUM SERPL-SCNC: 5.4 MMOL/L
PROT UR-MCNC: 30 MG/DL
RBC CASTS # UR COMP ASSIST: 0 /HPF — SIGNIFICANT CHANGE UP (ref 0–4)
SODIUM SERPL-SCNC: 144 MMOL/L
SP GR SPEC: 1.03 — SIGNIFICANT CHANGE UP (ref 1–1.03)
SQUAMOUS # UR AUTO: 1 /HPF — SIGNIFICANT CHANGE UP (ref 0–5)
UROBILINOGEN FLD QL: 1 MG/DL — SIGNIFICANT CHANGE UP (ref 0.2–1)
WBC UR QL: 0 /HPF — SIGNIFICANT CHANGE UP (ref 0–5)

## 2025-07-03 PROCEDURE — 36415 COLL VENOUS BLD VENIPUNCTURE: CPT

## 2025-07-03 PROCEDURE — 99284 EMERGENCY DEPT VISIT MOD MDM: CPT

## 2025-07-03 PROCEDURE — 83605 ASSAY OF LACTIC ACID: CPT

## 2025-07-03 PROCEDURE — 84100 ASSAY OF PHOSPHORUS: CPT

## 2025-07-03 PROCEDURE — 83735 ASSAY OF MAGNESIUM: CPT

## 2025-07-03 PROCEDURE — 81001 URINALYSIS AUTO W/SCOPE: CPT

## 2025-07-03 PROCEDURE — 82803 BLOOD GASES ANY COMBINATION: CPT

## 2025-07-03 PROCEDURE — 80053 COMPREHEN METABOLIC PANEL: CPT

## 2025-07-03 PROCEDURE — 85025 COMPLETE CBC W/AUTO DIFF WBC: CPT

## 2025-07-03 NOTE — ED PROVIDER NOTE - CARE PLAN
Principal Discharge DX:	Encounter for medical screening examination  Secondary Diagnosis:	Proteinuria  Secondary Diagnosis:	Glucosuria   1

## 2025-07-03 NOTE — ED PROVIDER NOTE - PROGRESS NOTE DETAILS
labs unremarkable. pH is normal. bicarb is more normal than baseline. will Dc patient home with follow up to PCP and give return precautions.

## 2025-07-03 NOTE — ED PROVIDER NOTE - PRINCIPAL DIAGNOSIS
Chief Complaint   Patient presents with   • Epigastric Pain     PT reports epigastric pain getting worse ove past 4 hours after vomiting x 1 today.     Blood Pressure (Abnormal) 182/72   Pulse 72   Temperature 36.5 °C (97.7 °F) (Oral)   Respiration 18   Height 1.829 m (6')   Weight 100.1 kg (220 lb 10.9 oz)   Oxygen Saturation 97%   Body Mass Index 29.93 kg/m²      Encounter for medical screening examination

## 2025-07-03 NOTE — ED PROVIDER NOTE - PHYSICAL EXAMINATION
General: well appearing, no acute distress, appropriate weight  Cardiac: heart RRR, no murmurs, rubs, gallops, pulses 2+ x4  Pulm: lungs CTA  GI: abdomen soft, nontender, negative meneses's, McBurney's, rovsings, rebound, CVA tenderness  Skin: warm, dry, no rash, laceration, abrasion, contusion

## 2025-07-03 NOTE — ED ADULT TRIAGE NOTE - CHIEF COMPLAINT QUOTE
PT reports to ED BIBA with complaints of abnormal lab values, according to pt. PH was reported to PT as 13. unsure of actual lab lab or testing.

## 2025-07-03 NOTE — ED PROVIDER NOTE - NSFOLLOWUPINSTRUCTIONS_ED_ALL_ED_FT
Please follow up with you PCP in the next 2 days    Return to the Emergency Department for worsening or persistent symptoms, and/or ANY NEW OR CONCERNING SYMPTOMS. If you have issues obtaining follow up, please call: [phone] or [phone]  to obtain a doctor or specialist who takes your insurance in your area.    Please return to the ED for any new or worsening problems including but not limited to: fever, chills, headache, chest pain, shortness of breath, palpitations, abdominal pain, nausea, vomiting, diarrhea, numbness or weakness.

## 2025-07-03 NOTE — ED PROVIDER NOTE - CLINICAL SUMMARY MEDICAL DECISION MAKING FREE TEXT BOX
patient is a 74-year-old male history of DM, dyskinesia, Parkinson's, Hypotension, HLD, loop recorder, Patient  Patient was recently admitted approximately 1 month ago, and discharged patient and his aide states that he was not prescribed metformin for his diabetes and has not had any since then.  Went to his  nephrologist yesterday, had lab work done showing bicarb of 13.  Patient denies fever, chills, chest pain, shortness of breath, abdominal pain, nausea, vomiting condition, hematuria.      Will obtain labs and reevaluate.

## 2025-07-03 NOTE — ED PROVIDER NOTE - PATIENT PORTAL LINK FT
You can access the FollowMyHealth Patient Portal offered by [facility] by registering at the following website: [email or web address]. By joining [facility] [employer]’s FollowMyHealth portal, you will also be able to view your health information using other applications (apps) compatible with our system.

## 2025-07-04 LAB
ALBUMIN SERPL ELPH-MCNC: 3.9 G/DL — SIGNIFICANT CHANGE UP (ref 3.3–5.2)
ALP SERPL-CCNC: 109 U/L — SIGNIFICANT CHANGE UP (ref 40–120)
ALT FLD-CCNC: 9 U/L — SIGNIFICANT CHANGE UP
ANION GAP SERPL CALC-SCNC: 12 MMOL/L — SIGNIFICANT CHANGE UP (ref 5–17)
AST SERPL-CCNC: 35 U/L — SIGNIFICANT CHANGE UP
BASE EXCESS BLDV CALC-SCNC: -2.8 MMOL/L — LOW (ref -2–3)
BASOPHILS # BLD AUTO: 0.05 K/UL — SIGNIFICANT CHANGE UP (ref 0–0.2)
BASOPHILS NFR BLD AUTO: 0.5 % — SIGNIFICANT CHANGE UP (ref 0–2)
BILIRUB SERPL-MCNC: <0.2 MG/DL — LOW (ref 0.4–2)
BUN SERPL-MCNC: 41.5 MG/DL — HIGH (ref 8–20)
CALCIUM SERPL-MCNC: 9.1 MG/DL — SIGNIFICANT CHANGE UP (ref 8.4–10.5)
CHLORIDE SERPL-SCNC: 105 MMOL/L — SIGNIFICANT CHANGE UP (ref 96–108)
CO2 SERPL-SCNC: 20 MMOL/L — LOW (ref 22–29)
CREAT SERPL-MCNC: 1.7 MG/DL — HIGH (ref 0.5–1.3)
EGFR: 41 ML/MIN/1.73M2 — LOW
EGFR: 41 ML/MIN/1.73M2 — LOW
EOSINOPHIL # BLD AUTO: 0.48 K/UL — SIGNIFICANT CHANGE UP (ref 0–0.5)
EOSINOPHIL NFR BLD AUTO: 5.3 % — SIGNIFICANT CHANGE UP (ref 0–6)
GAS PNL BLDV: SIGNIFICANT CHANGE UP
GLUCOSE SERPL-MCNC: 96 MG/DL — SIGNIFICANT CHANGE UP (ref 70–99)
HCO3 BLDV-SCNC: 23 MMOL/L — SIGNIFICANT CHANGE UP (ref 22–29)
HCT VFR BLD CALC: 31.8 % — LOW (ref 39–50)
HGB BLD-MCNC: 10.1 G/DL — LOW (ref 13–17)
IMM GRANULOCYTES # BLD AUTO: 0.04 K/UL — SIGNIFICANT CHANGE UP (ref 0–0.07)
IMM GRANULOCYTES NFR BLD AUTO: 0.4 % — SIGNIFICANT CHANGE UP (ref 0–0.9)
LACTATE SERPL-SCNC: 1.9 MMOL/L — SIGNIFICANT CHANGE UP (ref 0.5–2)
LYMPHOCYTES # BLD AUTO: 1.41 K/UL — SIGNIFICANT CHANGE UP (ref 1–3.3)
LYMPHOCYTES NFR BLD AUTO: 15.5 % — SIGNIFICANT CHANGE UP (ref 13–44)
MAGNESIUM SERPL-MCNC: 2.4 MG/DL — SIGNIFICANT CHANGE UP (ref 1.6–2.6)
MCHC RBC-ENTMCNC: 28.9 PG — SIGNIFICANT CHANGE UP (ref 27–34)
MCHC RBC-ENTMCNC: 31.8 G/DL — LOW (ref 32–36)
MCV RBC AUTO: 90.9 FL — SIGNIFICANT CHANGE UP (ref 80–100)
MONOCYTES # BLD AUTO: 0.86 K/UL — SIGNIFICANT CHANGE UP (ref 0–0.9)
MONOCYTES NFR BLD AUTO: 9.5 % — SIGNIFICANT CHANGE UP (ref 2–14)
NEUTROPHILS # BLD AUTO: 6.26 K/UL — SIGNIFICANT CHANGE UP (ref 1.8–7.4)
NEUTROPHILS NFR BLD AUTO: 68.8 % — SIGNIFICANT CHANGE UP (ref 43–77)
NRBC # BLD AUTO: 0 K/UL — SIGNIFICANT CHANGE UP (ref 0–0)
NRBC # FLD: 0 K/UL — SIGNIFICANT CHANGE UP (ref 0–0)
NRBC BLD AUTO-RTO: 0 /100 WBCS — SIGNIFICANT CHANGE UP (ref 0–0)
PCO2 BLDV: 40 MMHG — LOW (ref 42–55)
PH BLDV: 7.36 — SIGNIFICANT CHANGE UP (ref 7.32–7.43)
PHOSPHATE SERPL-MCNC: 3.3 MG/DL — SIGNIFICANT CHANGE UP (ref 2.4–4.7)
PLATELET # BLD AUTO: 281 K/UL — SIGNIFICANT CHANGE UP (ref 150–400)
PMV BLD: 9.3 FL — SIGNIFICANT CHANGE UP (ref 7–13)
PO2 BLDV: 220 MMHG — HIGH (ref 25–45)
POTASSIUM SERPL-MCNC: 5.1 MMOL/L — SIGNIFICANT CHANGE UP (ref 3.5–5.3)
POTASSIUM SERPL-SCNC: 5.1 MMOL/L — SIGNIFICANT CHANGE UP (ref 3.5–5.3)
PROT SERPL-MCNC: 6.9 G/DL — SIGNIFICANT CHANGE UP (ref 6.6–8.7)
RBC # BLD: 3.5 M/UL — LOW (ref 4.2–5.8)
RBC # FLD: 14.3 % — SIGNIFICANT CHANGE UP (ref 10.3–14.5)
SAO2 % BLDV: 99.3 % — SIGNIFICANT CHANGE UP
SODIUM SERPL-SCNC: 136 MMOL/L — SIGNIFICANT CHANGE UP (ref 135–145)
WBC # BLD: 9.1 K/UL — SIGNIFICANT CHANGE UP (ref 3.8–10.5)
WBC # FLD AUTO: 9.1 K/UL — SIGNIFICANT CHANGE UP (ref 3.8–10.5)

## 2025-07-04 PROCEDURE — 81001 URINALYSIS AUTO W/SCOPE: CPT

## 2025-07-04 PROCEDURE — 82803 BLOOD GASES ANY COMBINATION: CPT

## 2025-07-04 PROCEDURE — 84100 ASSAY OF PHOSPHORUS: CPT

## 2025-07-04 PROCEDURE — 83735 ASSAY OF MAGNESIUM: CPT

## 2025-07-04 PROCEDURE — 83605 ASSAY OF LACTIC ACID: CPT

## 2025-07-04 PROCEDURE — 99283 EMERGENCY DEPT VISIT LOW MDM: CPT

## 2025-07-04 PROCEDURE — 36415 COLL VENOUS BLD VENIPUNCTURE: CPT

## 2025-07-04 PROCEDURE — 85025 COMPLETE CBC W/AUTO DIFF WBC: CPT

## 2025-07-04 PROCEDURE — 80053 COMPREHEN METABOLIC PANEL: CPT

## 2025-07-15 ENCOUNTER — OUTPATIENT (OUTPATIENT)
Dept: OUTPATIENT SERVICES | Facility: HOSPITAL | Age: 75
LOS: 1 days | End: 2025-07-15
Payer: MEDICARE

## 2025-07-15 VITALS
SYSTOLIC BLOOD PRESSURE: 99 MMHG | DIASTOLIC BLOOD PRESSURE: 63 MMHG | OXYGEN SATURATION: 99 % | TEMPERATURE: 99 F | HEIGHT: 71 IN | HEART RATE: 66 BPM | RESPIRATION RATE: 18 BRPM | WEIGHT: 153 LBS

## 2025-07-15 DIAGNOSIS — Z96.89 PRESENCE OF OTHER SPECIFIED FUNCTIONAL IMPLANTS: Chronic | ICD-10-CM

## 2025-07-15 DIAGNOSIS — Z98.61 CORONARY ANGIOPLASTY STATUS: Chronic | ICD-10-CM

## 2025-07-15 DIAGNOSIS — Z98.890 OTHER SPECIFIED POSTPROCEDURAL STATES: Chronic | ICD-10-CM

## 2025-07-15 DIAGNOSIS — M47.816 SPONDYLOSIS WITHOUT MYELOPATHY OR RADICULOPATHY, LUMBAR REGION: ICD-10-CM

## 2025-07-15 PROCEDURE — 83036 HEMOGLOBIN GLYCOSYLATED A1C: CPT

## 2025-07-15 PROCEDURE — 80053 COMPREHEN METABOLIC PANEL: CPT

## 2025-07-15 PROCEDURE — 85027 COMPLETE CBC AUTOMATED: CPT

## 2025-07-15 PROCEDURE — G0463: CPT

## 2025-07-15 NOTE — H&P PST ADULT - NSICDXPASTMEDICALHX_GEN_ALL_CORE_FT
PAST MEDICAL HISTORY:  DM (diabetes mellitus)     Dyskinesia     H/O Parkinson's disease     History of BPH     History of hypotension     HLD (hyperlipidemia)      PAST MEDICAL HISTORY:  DM (diabetes mellitus)     Dyskinesia     H/O Parkinson's disease     History of BPH     History of hypotension     HLD (hyperlipidemia)     Stage 3 chronic kidney disease

## 2025-07-15 NOTE — H&P PST ADULT - NS MD HP INPLANTS MED DEV
loop recorder, spinal cord stimulator/Brain/Spinal implant loop recorder, spinal cord stimulator stents x6/Brain/Spinal implant

## 2025-07-15 NOTE — H&P PST ADULT - OTHER CARE PROVIDERS
Dr. Mcduffie  ([facility]) [phone] (pending further) Dr. Mcduffie  ([facility]) [phone]

## 2025-07-15 NOTE — H&P PST ADULT - HISTORY OF PRESENT ILLNESS
75 year old male with past medical hx of  parkinson's (dx in 2014), Dyskinesia, hypotension on midodrine T2DM, BPH. Pshx loop recorder, spinal cord stimulator implant. C/o motor fluctuations including levodopa-sensitive off symptoms in the last 2 years. Medication kicks in after 20 minutes and begins to wear off after 3 hours. Patient reports dyskinesia as his dose wears off. He feels slower, gait is worse, and voice is lower in the off state. Dyskinesia improves after dose kicks in.  Pt states stimulator does not help and would like to remove stimulator prior to possible DPS procedure with Dr. Suh. Denies any chest pain, palpitations, SOB, N/V, fever or chills. He now presents to PST prior to scheduled Removal of Spinal Cord Stimulator Paddle and Battery with Dr. Suh on 4/14/25.     Of note: Pt  was hospitalized last September for kidney failure, on dialysis for a month, then rehab discharge to home. Kidneys recovered. Pt also states saw cardiology for medical evaluation prior to procedure and further work up is required prior to approval. Patient he believes it because he has "fluid or something around the heart".   ?  04/08/25 Addendum: cardiac work-up revealed positive stress test. Pt. underwent cardiac cath on 04/03/25, which revealed severe CAD. Pt. had stents x 3 placed. As per Dr. MAGDALENO Jimenez recommendation: "Must defer any consideration for spine or brain surgery at this time".  Surgeon notified.  N.M.      ? 75 year old male with past medical hx of  parkinson's (dx in 2014), Dyskinesia, hypotension on midodrine T2DM, BPH. Pshx loop recorder, spinal cord stimulator implant. C/o motor fluctuations including levodopa-sensitive off symptoms in the last 2 years. Medication kicks in after 20 minutes and begins to wear off after 3 hours. Patient reports dyskinesia as his dose wears off. He feels slower, gait is worse, and voice is lower in the off state. Dyskinesia improves after dose kicks in.  Pt states stimulator does not help and would like to remove stimulator prior to possible DPS procedure with Dr. Suh. Denies any chest pain, palpitations, SOB, N/V, fever or chills. He now presents to PST prior to scheduled Removal of Spinal Cord Stimulator Paddle and Battery with Dr. Suh on 4/14/25.     Of note: Pt  was hospitalized last September for kidney failure, on dialysis for a month, then rehab discharge to home. Kidneys recovered. Pt also states saw cardiology for medical evaluation prior to procedure and further work up is required prior to approval. Patient he believes it because he has "fluid or something around the heart".   ?  04/08/25 Addendum: cardiac work-up revealed positive stress test. Pt. underwent cardiac cath on 04/03/25, which revealed severe CAD. Pt. had stents x 3 placed 4/3/2025 and another 3 stents 4/10/2025. As per Dr. MAGDALENO Jimenez recommendation: Pt returns to PST after having 6 stents placed in 4/2025 and is on Brilinta and aspirin. Email to Angeli states he would like pt to be off of both anticoagulant. Anesthesia would do case in ambi if pt had Cardiac clearance   and is clear to come off Brilinta and eval clearance labs.        ?

## 2025-07-18 ENCOUNTER — APPOINTMENT (OUTPATIENT)
Dept: NEPHROLOGY | Facility: CLINIC | Age: 75
End: 2025-07-18
Payer: MEDICARE

## 2025-07-18 VITALS
SYSTOLIC BLOOD PRESSURE: 92 MMHG | OXYGEN SATURATION: 95 % | WEIGHT: 150 LBS | DIASTOLIC BLOOD PRESSURE: 50 MMHG | BODY MASS INDEX: 19.79 KG/M2 | HEART RATE: 78 BPM

## 2025-07-18 VITALS — SYSTOLIC BLOOD PRESSURE: 88 MMHG | DIASTOLIC BLOOD PRESSURE: 52 MMHG

## 2025-07-18 PROBLEM — N18.30 CKD (CHRONIC KIDNEY DISEASE), STAGE III: Status: ACTIVE | Noted: 2025-06-27

## 2025-07-18 PROBLEM — N18.30 CHRONIC KIDNEY DISEASE, STAGE 3 UNSPECIFIED: Chronic | Status: ACTIVE | Noted: 2025-07-15

## 2025-07-18 LAB — GLUCOSE BLDC GLUCOMTR-MCNC: 173

## 2025-07-18 PROCEDURE — 99214 OFFICE O/P EST MOD 30 MIN: CPT

## 2025-07-18 PROCEDURE — 82962 GLUCOSE BLOOD TEST: CPT

## 2025-07-19 PROBLEM — E87.20 ACIDOSIS, METABOLIC: Status: ACTIVE | Noted: 2025-07-19

## 2025-07-19 PROBLEM — N40.0 BPH (BENIGN PROSTATIC HYPERPLASIA): Status: ACTIVE | Noted: 2025-06-27

## 2025-07-19 PROBLEM — I95.9 HYPOTENSION: Status: ACTIVE | Noted: 2025-07-19

## 2025-07-21 ENCOUNTER — APPOINTMENT (OUTPATIENT)
Dept: NEUROLOGY | Facility: CLINIC | Age: 75
End: 2025-07-21

## 2025-07-28 ENCOUNTER — APPOINTMENT (OUTPATIENT)
Dept: NEUROSURGERY | Facility: HOSPITAL | Age: 75
End: 2025-07-28

## 2025-08-14 ENCOUNTER — APPOINTMENT (OUTPATIENT)
Dept: NEUROSURGERY | Facility: CLINIC | Age: 75
End: 2025-08-14

## 2025-09-03 ENCOUNTER — APPOINTMENT (OUTPATIENT)
Dept: NEPHROLOGY | Facility: CLINIC | Age: 75
End: 2025-09-03